# Patient Record
Sex: FEMALE | Race: WHITE | NOT HISPANIC OR LATINO | Employment: FULL TIME | URBAN - METROPOLITAN AREA
[De-identification: names, ages, dates, MRNs, and addresses within clinical notes are randomized per-mention and may not be internally consistent; named-entity substitution may affect disease eponyms.]

---

## 2017-01-09 RX ORDER — IRBESARTAN 300 MG/1
TABLET ORAL
Refills: 3 | OUTPATIENT
Start: 2017-01-09

## 2017-01-09 NOTE — TELEPHONE ENCOUNTER
Was the patient seen in the last year in this department? No     Does patient have an active prescription for medications requested? No     Received Request Via: Pharmacy      Pt met protocol?: No, LAST OV AT [facility] [place]  6/16/15

## 2017-02-08 ENCOUNTER — OFFICE VISIT (OUTPATIENT)
Dept: MEDICAL GROUP | Facility: MEDICAL CENTER | Age: 67
End: 2017-02-08
Payer: MEDICARE

## 2017-02-08 VITALS
WEIGHT: 125 LBS | SYSTOLIC BLOOD PRESSURE: 158 MMHG | TEMPERATURE: 97.7 F | HEIGHT: 61 IN | OXYGEN SATURATION: 98 % | BODY MASS INDEX: 23.6 KG/M2 | DIASTOLIC BLOOD PRESSURE: 70 MMHG | HEART RATE: 80 BPM | RESPIRATION RATE: 16 BRPM

## 2017-02-08 DIAGNOSIS — E55.9 VITAMIN D INSUFFICIENCY: ICD-10-CM

## 2017-02-08 DIAGNOSIS — I10 ESSENTIAL HYPERTENSION: ICD-10-CM

## 2017-02-08 DIAGNOSIS — R42 DIZZY SPELLS: ICD-10-CM

## 2017-02-08 PROCEDURE — G8599 NO ASA/ANTIPLAT THER USE RNG: HCPCS | Performed by: INTERNAL MEDICINE

## 2017-02-08 PROCEDURE — G8420 CALC BMI NORM PARAMETERS: HCPCS | Performed by: INTERNAL MEDICINE

## 2017-02-08 PROCEDURE — 4040F PNEUMOC VAC/ADMIN/RCVD: CPT | Mod: 8P | Performed by: INTERNAL MEDICINE

## 2017-02-08 PROCEDURE — 1036F TOBACCO NON-USER: CPT | Performed by: INTERNAL MEDICINE

## 2017-02-08 PROCEDURE — 99214 OFFICE O/P EST MOD 30 MIN: CPT | Performed by: INTERNAL MEDICINE

## 2017-02-08 PROCEDURE — 3017F COLORECTAL CA SCREEN DOC REV: CPT | Performed by: INTERNAL MEDICINE

## 2017-02-08 PROCEDURE — G8484 FLU IMMUNIZE NO ADMIN: HCPCS | Performed by: INTERNAL MEDICINE

## 2017-02-08 PROCEDURE — 3014F SCREEN MAMMO DOC REV: CPT | Performed by: INTERNAL MEDICINE

## 2017-02-08 PROCEDURE — G8432 DEP SCR NOT DOC, RNG: HCPCS | Performed by: INTERNAL MEDICINE

## 2017-02-08 PROCEDURE — 1101F PT FALLS ASSESS-DOCD LE1/YR: CPT | Performed by: INTERNAL MEDICINE

## 2017-02-08 RX ORDER — IRBESARTAN 300 MG/1
300 TABLET ORAL
Qty: 90 TAB | Refills: 3 | Status: SHIPPED | OUTPATIENT
Start: 2017-02-08 | End: 2017-12-22 | Stop reason: SDUPTHER

## 2017-02-08 ASSESSMENT — PATIENT HEALTH QUESTIONNAIRE - PHQ9: CLINICAL INTERPRETATION OF PHQ2 SCORE: 0

## 2017-02-08 NOTE — MR AVS SNAPSHOT
"        Kelly Guerra   2017 1:00 PM   Office Visit   MRN: [id number]    Department:  [address]   Dept Phone:  [phone]    Description:  Female : 1950   Provider:  Mare Thomas M.D.           Reason for Visit     Dizziness with nausea happens monthly  & last about 1-2 min    Nevus back/ rough patch of skin on back also    Knee Injury left knee      Allergies as of 2017     Allergen Noted Reactions    Tape 2012         You were diagnosed with     Essential hypertension   [5090402]       Dizzy spells   [536942]       Vitamin D insufficiency   [994731]         Vital Signs     Blood Pressure Pulse Temperature Respirations Height Weight    158/70 mmHg 80 36.5 °C (97.7 °F) 16 1.549 m (5' 1\") 56.7 kg (125 lb)    Body Mass Index Oxygen Saturation Smoking Status             23.63 kg/m2 98% Never Smoker          Basic Information     Date Of Birth Sex Race Ethnicity Preferred Language    1950 Female White Non- English      Your appointments     May 30, 2017 10:30 AM   FOLLOW UP with Jeffrey Love M.D.   [facility] (--)    [address] , [facility] [address]  [place] [id number] [phone]              Problem List              ICD-10-CM Priority Class Noted - Resolved    Osteopenia M85.80   2011 - Present    Eczema L30.9   2011 - Present    History of appendectomy Z90.49   3/24/2012 - Present    History of colonic polyps Z86.010   3/24/2012 - Present    Sigmoid diverticulosis K57.30   2012 - Present    Prediabetes R73.03   2012 - Present    Hyperlipidemia E78.5   2012 - Present    Left ear pain H92.02   2013 - Present    Thyroid nodule E04.1   2013 - Present    Vitamin D insufficiency E55.9   2015 - Present    Dizzy spells R42   2015 - Present    Near syncope R55   10/12/2015 - Present    Essential hypertension, benign I10   10/12/2015 - Present    Esophageal reflux K21.9   2015 - " Present    Menopause Z78.0   11/19/2015 - Present    PVCs (premature ventricular contractions) I49.3   8/17/2016 - Present    Right-sided carotid artery disease (CMS-HCC) I77.9   11/18/2016 - Present    Dyslipidemia E78.5   11/18/2016 - Present    Coronary artery disease, non-occlusive I25.10   11/18/2016 - Present      Health Maintenance        Date Due Completion Dates    IMM DTaP/Tdap/Td Vaccine (1 - Tdap) 7/23/1968 ---    PAP SMEAR 7/23/1970 ---    IMM ZOSTER VACCINE 7/23/2009 ---    BONE DENSITY 7/23/2014 ---    IMM PNEUMOCOCCAL 65+ (ADULT) LOW/MEDIUM RISK SERIES (1 of 2 - PCV13) 7/23/2014 ---    IMM INFLUENZA (1) 9/1/2016 ---    COLONOSCOPY 6/7/2017 6/7/2012 (Done)    Override on 6/7/2012: Done (5 year return)    MAMMOGRAM 12/21/2017 12/21/2016, 10/7/2015, 12/30/2013, 12/26/2012, 6/22/2010, 6/22/2010, 11/26/2008, 11/26/2008, 10/24/2007, 10/24/2007            Current Immunizations     No immunizations on file.      Below and/or attached are the medications your provider expects you to take. Review all of your home medications and newly ordered medications with your provider and/or pharmacist. Follow medication instructions as directed by your provider and/or pharmacist. Please keep your medication list with you and share with your provider. Update the information when medications are discontinued, doses are changed, or new medications (including over-the-counter products) are added; and carry medication information at all times in the event of emergency situations     Allergies:  TAPE - (reactions not documented)               Medications  Valid as of: February 08, 2017 -  1:50 PM    Generic Name Brand Name Tablet Size Instructions for use    ALPRAZolam (Tab) XANAX 0.5 MG 1 tab 1 hour before MRI. Repeat in 1 hour as needed        Atorvastatin Calcium (Tab) LIPITOR 10 MG Take 1 Tab by mouth every bedtime.        Irbesartan (Tab) AVAPRO 300 MG Take 1 Tab by mouth every day.        Omeprazole (CAPSULE DELAYED  RELEASE) PRILOSEC 20 MG Take 1 Cap by mouth every day.        RaNITidine HCl   Take  by mouth.        .                 Medicines prescribed today were sent to:     [facility][id number] - [place], [place] - [address] [place] [address]    [address] [place] [address]    Phone: [phone] Fax: [phone]    Open 24 Hours?: No      Medication refill instructions:       If your prescription bottle indicates you have medication refills left, it is not necessary to call your provider’s office. Please contact your pharmacy and they will refill your medication.    If your prescription bottle indicates you do not have any refills left, you may request refills at any time through one of the following ways: The online [employer] system (except Urgent Care), by calling your provider’s office, or by asking your pharmacy to contact your provider’s office with a refill request. Medication refills are processed only during regular business hours and may not be available until the next business day. Your provider may request additional information or to have a follow-up visit with you prior to refilling your medication.   *Please Note: Medication refills are assigned a new Rx number when refilled electronically. Your pharmacy may indicate that no refills were authorized even though a new prescription for the same medication is available at the pharmacy. Please request the medicine by name with the pharmacy before contacting your provider for a refill.        Referral     A referral request has been sent to our patient care coordination department. Please allow 3-5 business days for us to process this request and contact you either by phone or mail. If you do not hear from us by the 5th business day, please call us at [phone].           [employer] Access Code: Activation code not generated  Current [employer] Status: Active

## 2017-02-08 NOTE — PROGRESS NOTES
"  Chief Complaint   Patient presents with   • Dizziness     with nausea happens monthly Jan 22 &23 last about 1-2 min   • Nevus     back/ rough patch of skin on back also   • Knee Injury     left knee       HISTORY OF PRESENT ILLNESS: Patient is a 66 y.o. female patient who presents today to follow-up on several chronic problems.     1. Essential hypertension    Patient has been on Avapro 300 mg daily for some time. Her blood pressures run in the 158/70 range. She states her father always had extremely high blood pressure and feels that her control is very adequate where it is. Apparently she has a history of not tolerating other blood pressure medications in the past.    2. Dizzy spells    Patient has been struggling with episodic \"dizzy spells\" since September 2014. She has had an extensive workup including by the cardiologist(Tilt table test, carotid ultrasound ×2, and stress echo), neurologist (MRI, MRA in November 2015), ENT evaluation, and abdominal CT scan. She had a complete blood panel and a mammogram within the last 6 months all of which were also normal. She continues to have her spells about 10 days per month. They occur in clusters and happen about once per day during a cluster. She describes an episode of intense nausea, sensation of falling such that she reaches out and has to grab onto whatever is nearby. The last 4 or 5 episodes have been as accompanied by quite distinct numbness and tingling from her shoulder down to her fingertips. Her  states that more recently she has been mildly confused and \"out of it\" for 10 or 15 minutes following each episode which only lasts about a minute. She had one while driving and following the episode was not certain of where she was, and it was in a familiar neighborhood. The only test she has not had thus far is an EEG. Otherwise the patient is in good health and she really has no complaints.    3. Vitamin D insufficiency    Results for KACY MEGHAN (MRN " [id number]) as of 2/8/2017 15:09   Ref. Range 6/16/2015 09:15 11/9/2015 11:03   25-Hydroxy   Vitamin D 25 Latest Ref Range: 30.0-100.0 ng/mL 22 (L) 20.9 (L)     Patient is not currently taking any supplementation. She did have a bone density done about 4 years ago where she was told that her bone density was actually above the average for her age. Specifically she did not have osteopenia or osteoporosis.      Patient Active Problem List    Diagnosis Date Noted   • Right-sided carotid artery disease (CMS-HCC) 11/18/2016   • Dyslipidemia 11/18/2016   • Coronary artery disease, non-occlusive 11/18/2016   • PVCs (premature ventricular contractions) 08/17/2016   • Esophageal reflux 11/19/2015   • Menopause 11/19/2015   • Near syncope 10/12/2015   • Essential hypertension, benign 10/12/2015   • Dizzy spells 08/24/2015   • Vitamin D insufficiency 06/29/2015   • Left ear pain 12/16/2013   • Thyroid nodule 12/16/2013   • Sigmoid diverticulosis 04/04/2012   • Prediabetes 04/04/2012   • Hyperlipidemia 04/04/2012   • History of appendectomy 03/24/2012   • History of colonic polyps 03/24/2012   • Osteopenia 06/07/2011   • Eczema 06/07/2011      Allergies:Tape    Current meds including changes today  Current Outpatient Prescriptions   Medication Sig Dispense Refill   • irbesartan (AVAPRO) 300 MG Tab Take 1 Tab by mouth every day. 90 Tab 3   • RaNITidine HCl (ZANTAC PO) Take  by mouth.     • omeprazole (PRILOSEC) 20 MG delayed-release capsule Take 1 Cap by mouth every day. (Patient not taking: Reported on 11/18/2016) 30 Cap 3   • alprazolam (XANAX) 0.5 MG Tab 1 tab 1 hour before MRI. Repeat in 1 hour as needed (Patient not taking: Reported on 11/18/2016) 4 Tab 0   • atorvastatin (LIPITOR) 10 MG Tab Take 1 Tab by mouth every bedtime. 90 Tab 3     No current facility-administered medications for this visit.     Social History   Substance Use Topics   • Smoking status: Never Smoker    • Smokeless tobacco: Not on file   • Alcohol Use:  "0.0 oz/week     0 Standard drinks or equivalent per week      Comment: occ     Social History     Social History Narrative       Family History   Problem Relation Age of Onset   • Hypertension Mother        Review of Systems:  No chest pain, No shortness of breath, No dyspnea on exertion  Gastrointestinal ROS: No abdominal pain, No nausea, vomiting, diarrhea, or constipation        Exam:      Blood pressure 158/70, pulse 80, temperature 36.5 °C (97.7 °F), resp. rate 16, height 1.549 m (5' 1\"), weight 56.7 kg (125 lb), SpO2 98 %.  General:  Well nourished, well developed female in NAD affect and mood within normal limits  Head is grossly normal.  Pulmonary: Clear to ausculation.  Normal effort. No rales, rhonchi, or wheezing.  Cardiovascular: Regular rate and rhythm without murmur.   Extremities: no clubbing, cyanosis, or edema.  Neuro: moves all extremities symmetrically      Please note that this dictation was created using voice recognition software. I have made every reasonable attempt to correct obvious errors, but I expect that there are errors of grammar and possibly content that I did not discover before finalizing the note.    Assessment/Plan:  1. Essential hypertension      - irbesartan (AVAPRO) 300 MG Tab; Take 1 Tab by mouth every day.  Dispense: 90 Tab; Refill: 3    2. Dizzy spells    I discussed with the patient and her  that we may never know what is causing her spells to occur. Certainly a lot of things have been ruled out including at brain tumor, aneurysm, any cardiac abnormality/arrhythmia (she also wore a Holter monitor for one month), inner ear pathology. With her history of post episode altered mental status it certainly raises the possibility of seizures.   A plain EEG may not catch it however as they are so episodic in nature.    - REFERRAL TO NEURODIAGNOSTICS (EEG,EP,EMG/NCS/DBS) Modality Requested: Ambulatory EEG    3. Vitamin D insufficiency    Recommended over-the-counter supplement " either multivitamin or specifically vitamin D. At this point she does not really need to have a repeat DEXA scan as her previous one was normal.    Total face-to-face time spent with patient 45 minutes discussing her complex history and her unusual but concerning to her medical problems.    Followup: No Follow-up on file.      Patient would prefer not to make a follow-up appointment at this time. She will call when she is ready to see Dr. Charles again.

## 2017-02-16 ENCOUNTER — TELEPHONE (OUTPATIENT)
Dept: HOSPICE | Facility: HOSPICE | Age: 67
End: 2017-02-16

## 2017-02-16 DIAGNOSIS — R42 DIZZY SPELLS: ICD-10-CM

## 2017-05-30 ENCOUNTER — OFFICE VISIT (OUTPATIENT)
Dept: CARDIOLOGY | Facility: MEDICAL CENTER | Age: 67
End: 2017-05-30
Payer: MEDICARE

## 2017-05-30 VITALS
HEIGHT: 61 IN | DIASTOLIC BLOOD PRESSURE: 80 MMHG | OXYGEN SATURATION: 94 % | WEIGHT: 125 LBS | HEART RATE: 67 BPM | SYSTOLIC BLOOD PRESSURE: 180 MMHG | BODY MASS INDEX: 23.6 KG/M2

## 2017-05-30 DIAGNOSIS — I25.10 CORONARY ARTERY DISEASE, NON-OCCLUSIVE: ICD-10-CM

## 2017-05-30 DIAGNOSIS — R42 DIZZY SPELLS: ICD-10-CM

## 2017-05-30 DIAGNOSIS — I49.3 PVCS (PREMATURE VENTRICULAR CONTRACTIONS): ICD-10-CM

## 2017-05-30 DIAGNOSIS — E78.5 DYSLIPIDEMIA: ICD-10-CM

## 2017-05-30 DIAGNOSIS — I10 ESSENTIAL HYPERTENSION, BENIGN: ICD-10-CM

## 2017-05-30 PROCEDURE — G8432 DEP SCR NOT DOC, RNG: HCPCS | Performed by: INTERNAL MEDICINE

## 2017-05-30 PROCEDURE — 3014F SCREEN MAMMO DOC REV: CPT | Performed by: INTERNAL MEDICINE

## 2017-05-30 PROCEDURE — 4040F PNEUMOC VAC/ADMIN/RCVD: CPT | Mod: 8P | Performed by: INTERNAL MEDICINE

## 2017-05-30 PROCEDURE — G8599 NO ASA/ANTIPLAT THER USE RNG: HCPCS | Performed by: INTERNAL MEDICINE

## 2017-05-30 PROCEDURE — 99214 OFFICE O/P EST MOD 30 MIN: CPT | Performed by: INTERNAL MEDICINE

## 2017-05-30 PROCEDURE — G8420 CALC BMI NORM PARAMETERS: HCPCS | Performed by: INTERNAL MEDICINE

## 2017-05-30 PROCEDURE — 1036F TOBACCO NON-USER: CPT | Performed by: INTERNAL MEDICINE

## 2017-05-30 PROCEDURE — 1101F PT FALLS ASSESS-DOCD LE1/YR: CPT | Performed by: INTERNAL MEDICINE

## 2017-05-30 ASSESSMENT — ENCOUNTER SYMPTOMS
PND: 0
PALPITATIONS: 0
MYALGIAS: 0
HEARTBURN: 0
DIZZINESS: 1
BLURRED VISION: 0
INSOMNIA: 0
CHILLS: 0
ABDOMINAL PAIN: 0
LOSS OF CONSCIOUSNESS: 0
ORTHOPNEA: 0
SHORTNESS OF BREATH: 0
NAUSEA: 0
FEVER: 0

## 2017-05-30 NOTE — PROGRESS NOTES
"Subjective:   Kelly Guerra is a 66 y.o. female who presents today for follow-up evaluation of \"nonobstructive CAD, hypertension, hyperlipidemia, PVCs and carotid artery disease and a history of persistent \"dizzy spells\".    Last seen on 8/17/2016.    Since 8/17/2016 appointment had a tilt table test that was normal.  The patient opted, on her own not to start Lipitor.  She saw her PCP Dr. Anna Charles and was supposed to be seen by ENT but for some reason her appointment did not go through.  Her dizzy spell episodes are unchanged.  They sometimes occur after she starts eating.  He also and predominantly occur with changing head positions such as moving around or sitting in the car looking in both directions.  In 2007 had a ruptured right eardrum procedure by Dr. Buddy Aguilar, ENT.    Past medical history  At 8/17/2016 appointment she continued to have episodic dizzy spells.  Her episodes are unpredictable.  3 weeks ago she states she had \"20 episodes in 6 days\" she's had none in the last 2 weeks.  She feels as if she has to stop what she is doing and sit down.  She has some associated nausea.  No distinct fugue-like state but possibly has some vague confusion.  Had previously seen a neurologist who stated that she was \"okay\".  No seizure activity.  Never had syncope.  No palpitations.  States blood pressure when checked runs 148/92.  Has been on a variety of antihypertensives therapy in the past including Diovan and Avapro which she feels does the best job.  She cannot recall what her other blood pressure medicines were.    Past medical history  Beginning in September, 2014 the patient has had recurrent episodes of dizziness.  Initially in September, 2013 she gone to an elementary school program for her presentation.  When she initially got out of the car she had profound dizziness but severe nausea. She went home and did not attend the program.  Since that time she's had monthly episodes of similar " "symptoms.    In August, 2015 the patient had a lot of episodes of dizziness and nausea.  The last 4 to days she's had 9 episodes.    In the interim she's been seen by ENT with no specific diagnosis.    She describes her episodes of dizziness as occurring spontaneously, fleeting with no vertigo and as \"I'm going to fall down\". She has to hold onto something otherwise she feels that she's going to fall. She has profound fatigue afterwards.    The patient denies palpitations chest pain or shortness of breath.    She's been treated for chronic hypertension.  No treatment for hyperlipidemia.  Head remote cardiac catheterization 2000 at [facility] that showed mild coronary artery disease.  Never smoked cigarettes. Denies diabetes mellitus.    Family history  Father suffered a stroke at age 55.  Father and brother have hypertension.    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  Does housework and yardwork.  . Children.  Does not drink alcohol.    Past Medical History   Diagnosis Date   • HTN (hypertension)      Past Surgical History   Procedure Laterality Date   • Colonoscopy  6/2012     clear - 5 years     Family History   Problem Relation Age of Onset   • Hypertension Mother      History   Smoking status   • Never Smoker    Smokeless tobacco   • Not on file     Allergies   Allergen Reactions   • Tape      Outpatient Encounter Prescriptions as of 5/30/2017   Medication Sig Dispense Refill   • irbesartan (AVAPRO) 300 MG Tab Take 1 Tab by mouth every day. 90 Tab 3   • RaNITidine HCl (ZANTAC PO) Take  by mouth.     • omeprazole (PRILOSEC) 20 MG delayed-release capsule Take 1 Cap by mouth every day. (Patient not taking: Reported on 11/18/2016) 30 Cap 3   • alprazolam (XANAX) 0.5 MG Tab 1 tab 1 hour before MRI. Repeat in 1 hour as needed (Patient not taking: Reported on 11/18/2016) 4 Tab 0   • atorvastatin (LIPITOR) 10 MG Tab Take 1 Tab by mouth every bedtime. (Patient not taking: Reported on 5/30/2017) 90 " "Tab 3     No facility-administered encounter medications on file as of 5/30/2017.     Review of Systems   Constitutional: Negative for fever and chills.   HENT: Positive for congestion, ear pain and tinnitus.    Eyes: Negative for blurred vision.   Respiratory: Negative for shortness of breath.    Cardiovascular: Negative for chest pain, palpitations, orthopnea, leg swelling and PND.   Gastrointestinal: Negative for heartburn, nausea and abdominal pain.   Genitourinary: Negative for dysuria.   Musculoskeletal: Negative for myalgias and joint pain.   Skin: Negative for rash.   Neurological: Positive for dizziness. Negative for loss of consciousness.   Psychiatric/Behavioral: The patient does not have insomnia.         Objective:   /80 mmHg  Pulse 67  Ht 1.549 m (5' 1\")  Wt 56.7 kg (125 lb)  BMI 23.63 kg/m2  SpO2 94%    Physical Exam   Constitutional: She is oriented to person, place, and time. She appears well-nourished. No distress.   HENT:   Head: Normocephalic.   Eyes: Conjunctivae and EOM are normal. Pupils are equal, round, and reactive to light. No scleral icterus.   Neck: Neck supple. No JVD present. Carotid bruit is not present. No thyromegaly present.   Normal jugular venous pressure.   Cardiovascular: Normal rate, regular rhythm, S1 normal and S2 normal.  Exam reveals no gallop and no friction rub.    No murmur heard.  Pulses:       Carotid pulses are 2+ on the right side, and 2+ on the left side.       Radial pulses are 2+ on the right side, and 2+ on the left side.        Femoral pulses are 2+ on the right side, and 2+ on the left side.       Posterior tibial pulses are 2+ on the right side, and 2+ on the left side.   No femoral bruits.   Pulmonary/Chest: Effort normal and breath sounds normal. She has no wheezes. She has no rhonchi. She has no rales.   Abdominal: Soft. Bowel sounds are normal. She exhibits no abdominal bruit, no pulsatile midline mass and no mass. There is no " "hepatosplenomegaly. There is no tenderness.   Musculoskeletal: She exhibits no edema.   Lymphadenopathy:     She has no cervical adenopathy.   Neurological: She is alert and oriented to person, place, and time. She has normal strength. Gait normal.   Skin: Skin is warm and dry. No cyanosis. Nails show no clubbing.   Psychiatric: She has a normal mood and affect. Her behavior is normal.        10/12/2015 EKG: Normal sinus rhythm, rate 63.    10/08/2000 CARDIAC CATHETERIZATION ([facility])  EF 65%. Normal wall motion.  LAD proximal 30% stenosis.  LAD mid 30% stenosis.  Circumflex artery minimal intimal plaquing distal vessel.  Ramus intermedius vessel normal.  RCA. Normal    10/03/2014 CAROTID ULTRASOUND  Minimal atherosclerotic disease within the left carotid bulb.   Resulting stenosis is much less than 50%.   12/20/2016 CAROTID ULTRASOUND  CONCLUSIONS   Normal bilateral carotid exam.    Very mild plaque of the carotid bifurcation bilaterally.    5/2016 cardiac event recorder: Isolated PVCs. Isolated PACs. Associated with symptoms of dizziness.    Assessment:     1. Essential hypertension, benign     2. Dyslipidemia     3. Dizzy spells     4. Coronary artery disease, non-occlusive     5. PVCs (premature ventricular contractions)         Medical Decision Making:  Today's Assessment / Status / Plan:     \"Dizzy spells\". Chronic. Positionally related. No evidence of cardiac etiology.    Hypertension. Labile. Chronic.    CAD. Nonobstructive. Clinically without symptoms of ischemia. Normal stress echocardiogram 12/2016    Carotid artery disease. Mild. Asymptomatic.     Hyperlipidemia. On atorvastatin    PVCs. Isolated.    2007. History of ruptured eardrum.    Recommendation:/Plan/Discussion  Continue current therapy.  Lipid panel next appointment.  Continue to monitor blood pressure.  Follow-up 6 months.  "

## 2017-05-30 NOTE — MR AVS SNAPSHOT
"        Kelly Guerra   2017 10:30 AM   Office Visit   MRN: [id number]    Department:  [facility]   Dept Phone:  [phone]    Description:  Female : 1950   Provider:  Jeffrey Love M.D.           Reason for Visit     Follow-Up           Allergies as of 2017     Allergen Noted Reactions    Tape 2012         You were diagnosed with     Essential hypertension, benign   [401.1.ICD-9-CM]       Dyslipidemia   [298678]         Vital Signs     Blood Pressure Pulse Height Weight Body Mass Index Oxygen Saturation    180/80 mmHg 67 1.549 m (5' 1\") 56.7 kg (125 lb) 23.63 kg/m2 94%    Smoking Status                   Never Smoker            Basic Information     Date Of Birth Sex Race Ethnicity Preferred Language    1950 Female White Non- English      Problem List              ICD-10-CM Priority Class Noted - Resolved    Osteopenia M85.80   2011 - Present    Eczema L30.9   2011 - Present    History of appendectomy Z90.49   3/24/2012 - Present    History of colonic polyps Z86.010   3/24/2012 - Present    Sigmoid diverticulosis K57.30   2012 - Present    Prediabetes R73.03   2012 - Present    Hyperlipidemia E78.5   2012 - Present    Left ear pain H92.02   2013 - Present    Thyroid nodule E04.1   2013 - Present    Vitamin D insufficiency E55.9   2015 - Present    Dizzy spells R42   2015 - Present    Near syncope R55   10/12/2015 - Present    Essential hypertension, benign I10   10/12/2015 - Present    Esophageal reflux K21.9   2015 - Present    Menopause Z78.0   2015 - Present    PVCs (premature ventricular contractions) I49.3   2016 - Present    Right-sided carotid artery disease (CMS-HCC) I77.9   2016 - Present    Dyslipidemia E78.5   2016 - Present    Coronary artery disease, non-occlusive I25.10   2016 - Present      Health Maintenance        Date Due Completion Dates    IMM DTaP/Tdap/Td Vaccine (1 - " Tdap) 7/23/1968 ---    PAP SMEAR 7/23/1970 ---    IMM ZOSTER VACCINE 7/23/2009 ---    BONE DENSITY 7/23/2014 ---    IMM PNEUMOCOCCAL 65+ (ADULT) LOW/MEDIUM RISK SERIES (1 of 2 - PCV13) 7/23/2014 ---    COLONOSCOPY 6/7/2017 6/7/2012 (Done)    Override on 6/7/2012: Done (5 year return)    MAMMOGRAM 12/21/2017 12/21/2016, 10/7/2015, 12/30/2013, 12/26/2012, 6/22/2010, 6/22/2010, 11/26/2008, 11/26/2008, 10/24/2007, 10/24/2007            Current Immunizations     No immunizations on file.      Below and/or attached are the medications your provider expects you to take. Review all of your home medications and newly ordered medications with your provider and/or pharmacist. Follow medication instructions as directed by your provider and/or pharmacist. Please keep your medication list with you and share with your provider. Update the information when medications are discontinued, doses are changed, or new medications (including over-the-counter products) are added; and carry medication information at all times in the event of emergency situations     Allergies:  TAPE - (reactions not documented)               Medications  Valid as of: May 30, 2017 - 11:15 AM    Generic Name Brand Name Tablet Size Instructions for use    ALPRAZolam (Tab) XANAX 0.5 MG 1 tab 1 hour before MRI. Repeat in 1 hour as needed        Atorvastatin Calcium (Tab) LIPITOR 10 MG Take 1 Tab by mouth every bedtime.        Irbesartan (Tab) AVAPRO 300 MG Take 1 Tab by mouth every day.        Omeprazole (CAPSULE DELAYED RELEASE) PRILOSEC 20 MG Take 1 Cap by mouth every day.        RaNITidine HCl   Take  by mouth.        .                 Medicines prescribed today were sent to:     [facility]ACY #[id number] - [place] - [address]    [address] [place] [address]    Phone: [phone] Fax: [phone]    Open 24 Hours?: No      Medication refill instructions:       If your prescription bottle indicates you have medication refills left, it is not  necessary to call your provider’s office. Please contact your pharmacy and they will refill your medication.    If your prescription bottle indicates you do not have any refills left, you may request refills at any time through one of the following ways: The online [employer] system (except Urgent Care), by calling your provider’s office, or by asking your pharmacy to contact your provider’s office with a refill request. Medication refills are processed only during regular business hours and may not be available until the next business day. Your provider may request additional information or to have a follow-up visit with you prior to refilling your medication.   *Please Note: Medication refills are assigned a new Rx number when refilled electronically. Your pharmacy may indicate that no refills were authorized even though a new prescription for the same medication is available at the pharmacy. Please request the medicine by name with the pharmacy before contacting your provider for a refill.           [employer] Access Code: Activation code not generated  Current [employer] Status: Active

## 2017-07-03 ENCOUNTER — PATIENT OUTREACH (OUTPATIENT)
Dept: HEALTH INFORMATION MANAGEMENT | Facility: OTHER | Age: 67
End: 2017-07-03

## 2017-07-03 ENCOUNTER — TELEPHONE (OUTPATIENT)
Dept: HEALTH INFORMATION MANAGEMENT | Facility: OTHER | Age: 67
End: 2017-07-03

## 2017-07-03 DIAGNOSIS — Z78.0 MENOPAUSE: ICD-10-CM

## 2017-07-03 NOTE — PROGRESS NOTES
Attempt #:1    WebIZ Checked & Epic Updated: yes  HealthConnect Verified: no  Verify PCP: yes    Communication Preference Obtained: yes     Review Care Team: yes    Annual Wellness Visit Scheduling  1. Scheduling Status:Scheduled        Care Gap Scheduling (Attempt to Schedule EACH Overdue Care Gap!)     Health Maintenance Due   Topic Date Due   • Annual Wellness Visit  Scheduled   • IMM DTaP/Tdap/Td Vaccine (1 - Tdap) Scheduled   • PAP SMEAR  Requested records   • IMM ZOSTER VACCINE  Scheduled   • BONE DENSITY  Scheduled   • IMM PNEUMOCOCCAL 65+ (ADULT) LOW/MEDIUM RISK SERIES (1 of 2 - PCV13) Scheduled   • COLONOSCOPY  Informed- Patient has apt. On 08/24/17 w/ [facility]         Sciona Activation: already active  Sciona Martell: no  Virtual Visits: no  Opt In to Text Messages: no

## 2017-07-10 ENCOUNTER — OFFICE VISIT (OUTPATIENT)
Dept: MEDICAL GROUP | Facility: PHYSICIAN GROUP | Age: 67
End: 2017-07-10
Payer: MEDICARE

## 2017-07-10 VITALS
BODY MASS INDEX: 23.22 KG/M2 | HEIGHT: 61 IN | HEART RATE: 75 BPM | TEMPERATURE: 98.6 F | SYSTOLIC BLOOD PRESSURE: 132 MMHG | OXYGEN SATURATION: 98 % | DIASTOLIC BLOOD PRESSURE: 78 MMHG | WEIGHT: 123 LBS

## 2017-07-10 DIAGNOSIS — K21.9 GASTROESOPHAGEAL REFLUX DISEASE WITHOUT ESOPHAGITIS: ICD-10-CM

## 2017-07-10 DIAGNOSIS — E78.5 DYSLIPIDEMIA: ICD-10-CM

## 2017-07-10 DIAGNOSIS — E55.9 VITAMIN D INSUFFICIENCY: ICD-10-CM

## 2017-07-10 DIAGNOSIS — I10 ESSENTIAL HYPERTENSION, BENIGN: ICD-10-CM

## 2017-07-10 DIAGNOSIS — Z00.00 MEDICARE ANNUAL WELLNESS VISIT, INITIAL: Primary | ICD-10-CM

## 2017-07-10 DIAGNOSIS — K57.30 SIGMOID DIVERTICULOSIS: ICD-10-CM

## 2017-07-10 DIAGNOSIS — M85.80 OSTEOPENIA, UNSPECIFIED LOCATION: ICD-10-CM

## 2017-07-10 DIAGNOSIS — R42 DIZZY SPELLS: ICD-10-CM

## 2017-07-10 PROCEDURE — G0438 PPPS, INITIAL VISIT: HCPCS | Performed by: INTERNAL MEDICINE

## 2017-07-10 ASSESSMENT — PATIENT HEALTH QUESTIONNAIRE - PHQ9: CLINICAL INTERPRETATION OF PHQ2 SCORE: 0

## 2017-07-10 NOTE — PROGRESS NOTES
Chief Complaint   Patient presents with   • Annual Exam     initial         HPI:  Kelly Guerra is a 66 y.o. here for Medicare Annual Wellness Visit     Patient Active Problem List    Diagnosis Date Noted   • Dyslipidemia 11/18/2016   • Esophageal reflux 11/19/2015   • Essential hypertension, benign 10/12/2015   • Dizzy spells 08/24/2015   • Vitamin D insufficiency 06/29/2015   • Sigmoid diverticulosis 04/04/2012   • Osteopenia 06/07/2011       Current Outpatient Prescriptions   Medication Sig Dispense Refill   • irbesartan (AVAPRO) 300 MG Tab Take 1 Tab by mouth every day. 90 Tab 3   • RaNITidine HCl (ZANTAC PO) Take  by mouth.     • atorvastatin (LIPITOR) 10 MG Tab Take 1 Tab by mouth every bedtime. 90 Tab 3     No current facility-administered medications for this visit.            Current supplements as per medication list.       Allergies: Tape    Current social contact/activitie: Patient is , she still works part-time. She enjoys traveling with her .     She  reports that she has never smoked. She has never used smokeless tobacco. She reports that she does not drink alcohol or use illicit drugs.  Counseling given: Not Answered        DPA/Advanced Directive:  Patient has Advanced Directive on file.       ROS:    Gait: Uses no assistive device   Ostomy: no   Other tubes: no   Amputations: no   Chronic oxygen use: no   Last eye exam: December 2016   : Denies incontinence.       Screening:    Depression Screening    Little interest or pleasure in doing things?  0 - not at all  Feeling down, depressed , or hopeless? 0 - not at all  Trouble falling or staying asleep, or sleeping too much?     Feeling tired or having little energy?     Poor appetite or overeating?     Feeling bad about yourself - or that you are a failure or have let yourself or your family down?    Trouble concentrating on things, such as reading the newspaper or watching television?    Moving or speaking so slowly that other people  could have noticed.  Or the opposite - being so fidgety or restless that you have been moving around a lot more than usual?     Thoughts that you would be better off dead, or of hurting yourself?     Patient Health Questionnaire Score:    If depressive symptoms identified deferred to follow up visit unless specifically addressed in assessment and plan.    Interpretation of PHQ-9 Total Score   Score Severity   1-4 Minimal Depression   5-9 Mild Depression   10-14 Moderate Depression   15-19 Moderately Severe Depression   20-27 Severe Depression    Screening for Cognitive Impairment    Three Minute Recall (banana, sunrise, fence)  2/3    Draw clock face with all 12 numbers set to the hand to show 10 minures past 11 o'clock  1 5  If cognitive concerns identified deferred to follow up visit unless specifically addressed in assessment and plan.    Fall Risk Assessment    Has the patient had two or more falls in the last year or any fall with injury in the last year?  No  If Fall Risk identified deferred to follow up visit unless specifically addressed in assessment and plan.    Safety Assessment    Throw rugs on floor.  No  Handrails on all stairs.  Yes  Good lighting in all hallways.  Yes  Difficulty hearing.  No  Patient counseled about all safety risks that were identified.    Functional Assessment ADLs    Are there any barriers preventing you from cooking for yourself or meeting nutritional needs?  No.    Are there any barriers preventing you from driving safely or obtaining transportation?  No.    Are there any barriers preventing you from using a telephone or calling for help?  No.    Are there any barriers preventing you from shopping?  No.    Are there any barriers preventing you from taking care of your own finances?  No.    Are there any barriers preventing you from managing your medications?  No.    Are currently engaing any exercise or physical activity?  No.       Health Maintenance Summary                 "Annual Wellness Visit Overdue 1950     IMM DTaP/Tdap/Td Vaccine Overdue 7/23/1968     IMM ZOSTER VACCINE Overdue 7/23/2009     BONE DENSITY Overdue 7/23/2014     IMM PNEUMOCOCCAL 65+ (ADULT) LOW/MEDIUM RISK SERIES Overdue 7/23/2014     COLONOSCOPY Overdue 6/7/2017      Done 6/7/2012 5 year return    IMM INFLUENZA Next Due 9/1/2017     MAMMOGRAM Next Due 12/21/2017      Done 12/21/2016 MA-MAMMO SCREENING BILAT W/TOMOSYNTHESIS W/CAD     Patient has more history with this topic...    PAP SMEAR Next Due 12/5/2019      Done 12/5/2016 PAP IG (IMAGE GUIDED)          Patient Care Team:  Anna Charles M.D. as PCP - General (Family Medicine)  Your Physician (Emergency Medicine)  Mare Thomas M.D. (Internal Medicine)  [facility] as Consulting Physician  Jeffrey Love M.D. as Consulting Physician (Cardiology)      Social History   Substance Use Topics   • Smoking status: Never Smoker    • Smokeless tobacco: Never Used   • Alcohol Use: No     Family History   Problem Relation Age of Onset   • Hypertension Mother    • Stroke Father      She  has a past medical history of HTN (hypertension).   Past Surgical History   Procedure Laterality Date   • Colonoscopy  6/2012     clear - 5 years       Exam:     Blood pressure 132/78, pulse 75, temperature 37 °C (98.6 °F), height 1.549 m (5' 0.98\"), weight 55.792 kg (123 lb), SpO2 98 %. Body mass index is 23.25 kg/(m^2).    Hearing excellent.    Dentition good  Alert, oriented in no acute distress.  Eye contact is good, speech goal directed, affect calm      Assessment and Plan. The following treatment and monitoring plan is recommended:    1. Dizzy spells    Ongoing problem. Patient has had episodes for the past 3 years where she becomes nauseated and dizzy, these last for several minutes. She has had an extensive cardiac workup including tilt table, has seen a neurologist, and everything has come back negative. She has follow-up with Dr. Charles in " October.  - Initial Wellness Visit - Includes PPPS ()    2. Essential hypertension, benign    Stable problem, continue medication.  - Initial Wellness Visit - Includes PPPS ()    3. Osteopenia, unspecified location    Stable problem, patient has bone density scheduled for the end of August. It has been sometime since she has had one. Not currently taking calcium or vitamin D.  - Initial Wellness Visit - Includes PPPS ()    4. Dyslipidemia    Patient with mildly elevated cholesterol of 200. Not currently taking medication.  - Initial Wellness Visit - Includes PPPS ()    5. Gastroesophageal reflux disease without esophagitis    Stable problem, has had for many years. Takes Zantac several days per week, dietary modification.  - Initial Wellness Visit - Includes PPPS ()    6. Vitamin D insufficiency    Ongoing issue, has had historically low vitamin D level last checked 2 years ago. Not currently taking supplementation. Will have blood work prior to her annual with Dr. Charles in October.  - Initial Wellness Visit - Includes PPPS ()    7. Sigmoid diverticulosis    By colonoscopy, also has history of colonic polyps. Scheduled for her 5 year repeat colonoscopy at the end of this month.  - Initial Wellness Visit - Includes PPPS ()    8. Medicare annual wellness visit, initial    Up-to-date on cancer screening, is due for shingles vaccine and Pneumovax. She would like to obtain these at the pharmacy as it is more cost effective.  - Initial Wellness Visit - Includes PPPS ()        Services suggested: No services needed at this time  Health Care Screening: Age-appropriate preventive services Medicare covers discussed today and ordered if indicated.  Referrals offered: Community-based lifestyle interventions to reduce health risks and promote self-management and wellness, fall prevention, nutrition, physical activity, tobacco-use cessation, weight loss, and mental health services as per  orders if indicated.    Discussion today about general wellness and lifestyle habits:    · Prevent falls and reduce trip hazards; Cautioned about securing or removing rugs.  · Have a working fire alarm and carbon monoxide detector;   · Engage in regular physical activity and social activities         Follow-up: Return in about 1 year (around 7/10/2018) for AWV.

## 2017-07-10 NOTE — MR AVS SNAPSHOT
"Kelly Guerra   7/10/2017 1:20 PM   Office Visit   MRN: [id number]    Department:  [facility]   Dept Phone:  [phone]    Description:  Female : 1950   Provider:  Mare Thomas M.D.           Reason for Visit     Annual Exam initial      Allergies as of 7/10/2017     Allergen Noted Reactions    Tape 2012         You were diagnosed with     Medicare annual wellness visit, initial   [292010]  -  Primary     Dizzy spells   [993288]       Essential hypertension, benign   [401.1.ICD-9-CM]       Osteopenia, unspecified location   [7858477]       Dyslipidemia   [572688]       Gastroesophageal reflux disease without esophagitis   [591552]       Vitamin D insufficiency   [908470]       Sigmoid diverticulosis   [964389]         Vital Signs     Blood Pressure Pulse Temperature Height Weight Body Mass Index    132/78 mmHg 75 37 °C (98.6 °F) 1.549 m (5' 0.98\") 55.792 kg (123 lb) 23.25 kg/m2    Oxygen Saturation Smoking Status                98% Never Smoker           Basic Information     Date Of Birth Sex Race Ethnicity Preferred Language    1950 Female White Non- English      Your appointments     2017  9:30 AM   BONE DENSITY (DEXASCAN) with [facility]   [facility] ([address])    [address] [place] [id number] [phone]           No calcium supplements 24 hours prior to exam, including antacids or multivitamins w/calcium.  Pt may eat and drink normally.  No injection procedures prior to Dexa on the same day.  No barium contrast, no CTs with IV or oral contrast, no Pet/CTs and no Nuc Med injections for 7 days prior to a Dexa (including Barium Swallow, Upper GI, Small Bowel Series).  If scheduling a Dexa on the same day as a CT with IV or oral contrast, any test with barium, Pet/CT or a Nuc Med with injection, always schedule the Dexa before the other study.            Oct 02, 2017  1:00 PM   Established Patient with Anna CONWAY" MORAIMA Charles.   [facility] (--)    [address]  [address]  [place] [id number] [phone]           You will be receiving a confirmation call a few days before your appointment from our automated call confirmation system.            Dec 06, 2017 10:30 AM   FOLLOW UP with Jeffrey Love M.D.   [facility] (--)    [address], [address]  [place] [id number] [phone]              Problem List              ICD-10-CM Priority Class Noted - Resolved    Osteopenia M85.80   6/7/2011 - Present    Sigmoid diverticulosis K57.30   4/4/2012 - Present    Vitamin D insufficiency E55.9   6/29/2015 - Present    Dizzy spells R42   8/24/2015 - Present    Essential hypertension, benign I10   10/12/2015 - Present    Esophageal reflux K21.9   11/19/2015 - Present    Dyslipidemia E78.5   11/18/2016 - Present      Health Maintenance        Date Due Completion Dates    IMM DTaP/Tdap/Td Vaccine (1 - Tdap) 7/23/1968 ---    IMM ZOSTER VACCINE 7/23/2009 ---    BONE DENSITY 7/23/2014 ---    IMM PNEUMOCOCCAL 65+ (ADULT) LOW/MEDIUM RISK SERIES (1 of 2 - PCV13) 7/23/2014 ---    COLONOSCOPY 6/7/2017 6/7/2012 (Done)    Override on 6/7/2012: Done (5 year return)    IMM INFLUENZA (1) 9/1/2017 ---    MAMMOGRAM 12/21/2017 12/21/2016, 10/7/2015, 12/30/2013, 12/26/2012, 6/22/2010, 6/22/2010, 11/26/2008, 11/26/2008, 10/24/2007, 10/24/2007    PAP SMEAR 12/5/2019 12/5/2016            Current Immunizations     No immunizations on file.      Below and/or attached are the medications your provider expects you to take. Review all of your home medications and newly ordered medications with your provider and/or pharmacist. Follow medication instructions as directed by your provider and/or pharmacist. Please keep your medication list with you and share with your provider. Update the information when medications are discontinued, doses are changed, or new medications (including  over-the-counter products) are added; and carry medication information at all times in the event of emergency situations     Allergies:  TAPE - (reactions not documented)               Medications  Valid as of: July 10, 2017 -  1:58 PM    Generic Name Brand Name Tablet Size Instructions for use    Atorvastatin Calcium (Tab) LIPITOR 10 MG Take 1 Tab by mouth every bedtime.        Irbesartan (Tab) AVAPRO 300 MG Take 1 Tab by mouth every day.        RaNITidine HCl   Take  by mouth.        .                 Medicines prescribed today were sent to:     [facility]/PHARMACY #[id number] - [place], [place] - [address]    [address] [place] [address]    Phone: [phone] Fax: [phone]    Open 24 Hours?: No      Medication refill instructions:       If your prescription bottle indicates you have medication refills left, it is not necessary to call your provider’s office. Please contact your pharmacy and they will refill your medication.    If your prescription bottle indicates you do not have any refills left, you may request refills at any time through one of the following ways: The online [employer] system (except Urgent Care), by calling your provider’s office, or by asking your pharmacy to contact your provider’s office with a refill request. Medication refills are processed only during regular business hours and may not be available until the next business day. Your provider may request additional information or to have a follow-up visit with you prior to refilling your medication.   *Please Note: Medication refills are assigned a new Rx number when refilled electronically. Your pharmacy may indicate that no refills were authorized even though a new prescription for the same medication is available at the pharmacy. Please request the medicine by name with the pharmacy before contacting your provider for a refill.           [employer] Access Code: Activation code not generated  Current [employer] Status: Active

## 2017-07-25 ENCOUNTER — HOSPITAL ENCOUNTER (OUTPATIENT)
Dept: RADIOLOGY | Facility: MEDICAL CENTER | Age: 67
End: 2017-07-25
Attending: FAMILY MEDICINE
Payer: MEDICARE

## 2017-07-25 DIAGNOSIS — Z78.0 MENOPAUSE: ICD-10-CM

## 2017-07-25 PROCEDURE — 77080 DXA BONE DENSITY AXIAL: CPT

## 2017-10-16 ENCOUNTER — TELEPHONE (OUTPATIENT)
Dept: MEDICAL GROUP | Facility: LAB | Age: 67
End: 2017-10-16

## 2017-10-16 NOTE — TELEPHONE ENCOUNTER
ESTABLISHED PATIENT PRE-VISIT PLANNING     Note: Patient will not be contacted if there is no indication to call.     1.  Reviewed notes from the last few office visits within the medical group: Yes    2.  If any orders were placed at last visit or intended to be done for this visit (i.e. 6 mos follow-up), do we have Results/Consult Notes?        •  Labs - Labs ordered, NOT completed. Patient advised to complete prior to next appointment.   Note: If patient appointment is for lab review and patient did not complete labs,                check with provider if OK to reschedule patient until labs completed.       •  Imaging - Imaging ordered, completed and results are in chart.       •  Referrals - No referrals were ordered at last office visit.    3. Is this appointment scheduled as a Hospital Follow-Up? No    4.  Immunizations were updated in [employer] using WebIZ?: Yes       •  Web Iz Recommendations: FLU, PREVNAR (PCV13)  and TDAP    5.  Patient is due for the following Health Maintenance Topics:   Health Maintenance Due   Topic Date Due   • IMM DTaP/Tdap/Td Vaccine (1 - Tdap) 07/23/1968   • IMM ZOSTER VACCINE  07/23/2009   • IMM PNEUMOCOCCAL 65+ (ADULT) LOW/MEDIUM RISK SERIES (1 of 2 - PCV13) 07/23/2014   • COLONOSCOPY  06/07/2017   • IMM INFLUENZA (1) 09/01/2017           6.  Patient was NOT informed to arrive 15 min prior to their scheduled appointment and bring in their medication bottles.

## 2017-10-17 ENCOUNTER — OFFICE VISIT (OUTPATIENT)
Dept: MEDICAL GROUP | Facility: LAB | Age: 67
End: 2017-10-17
Payer: MEDICARE

## 2017-10-17 VITALS
SYSTOLIC BLOOD PRESSURE: 120 MMHG | HEART RATE: 70 BPM | RESPIRATION RATE: 12 BRPM | WEIGHT: 119 LBS | OXYGEN SATURATION: 98 % | BODY MASS INDEX: 22.47 KG/M2 | DIASTOLIC BLOOD PRESSURE: 80 MMHG | TEMPERATURE: 97.8 F | HEIGHT: 61 IN

## 2017-10-17 DIAGNOSIS — R11.0 NAUSEA: ICD-10-CM

## 2017-10-17 DIAGNOSIS — E78.5 DYSLIPIDEMIA: ICD-10-CM

## 2017-10-17 DIAGNOSIS — I10 ESSENTIAL HYPERTENSION, BENIGN: ICD-10-CM

## 2017-10-17 DIAGNOSIS — R10.813 RIGHT LOWER QUADRANT ABDOMINAL TENDERNESS WITHOUT REBOUND TENDERNESS: ICD-10-CM

## 2017-10-17 DIAGNOSIS — E55.9 VITAMIN D INSUFFICIENCY: ICD-10-CM

## 2017-10-17 DIAGNOSIS — R10.31 COLICKY RLQ ABDOMINAL PAIN: ICD-10-CM

## 2017-10-17 DIAGNOSIS — R63.4 UNINTENDED WEIGHT LOSS: ICD-10-CM

## 2017-10-17 DIAGNOSIS — R73.09 ELEVATED HEMOGLOBIN A1C: ICD-10-CM

## 2017-10-17 PROCEDURE — 99214 OFFICE O/P EST MOD 30 MIN: CPT | Performed by: FAMILY MEDICINE

## 2017-10-17 NOTE — PROGRESS NOTES
Chief Complaint   Patient presents with   • Nausea     lasting a few days/ 1 every 5-6 weeks/ X 3 years   • RLQ Pain     tenderness on and off gets worse with greassy foods   • Other     will call to schedule to do Colonoscopy       HISTORY OF PRESENT ILLNESS: Patient is a 67 y.o. female established patient who presents today with above    Nausea  This is ongoing along with dixxiness. Will have 5-10 days of feeling nauseated. Is immediate and then stops.   Did see neurology and also cardiology . Dizziness is getting better and also not as severe. MRI of the head was normal. Saw ENT and ruled out menieres she reports     RLQ pain  This is new to discuss today. This started in the last couple of months. Is very tender at times. Notices this more after she has greasy food like an in and out burger. Is not constant, is tender. Spicy foods sometimes does this too      She is due for a colonscopy and will call to make this appointment. She had to cancel last appt     Weight loss, unintentional. Hew issue. Has lost 18-20#, only 4# since July     Also is taking a baby ASA and avapro 300 mg daily     Last gyn exam is due   Mammo done 12/2016    Patient Active Problem List    Diagnosis Date Noted   • Dyslipidemia  This is chronic. She stopped taking lipitor 10 mg once daily. She is afraid to try another medication   11/18/2016   • Esophageal reflux 11/19/2015   • Essential hypertension, benign  This is chronic. She is taking Avapro 300 mg once daily  10/12/2015   • Dizzy spells 08/24/2015   • Vitamin D insufficiency  This is chronic. Not taking vitamin d  06/29/2015   • Sigmoid diverticulosis 04/04/2012   • Osteopenia 06/07/2011        Allergies:Tape    Current Outpatient Prescriptions   Medication Sig Dispense Refill   • irbesartan (AVAPRO) 300 MG Tab Take 1 Tab by mouth every day. 90 Tab 3   • RaNITidine HCl (ZANTAC PO) Take  by mouth.     • atorvastatin (LIPITOR) 10 MG Tab Take 1 Tab by mouth every bedtime. 90 Tab 3     No  "current facility-administered medications for this visit.        Social History   Substance Use Topics   • Smoking status: Never Smoker   • Smokeless tobacco: Never Used   • Alcohol use No       Social History     Social History Narrative   • No narrative on file       Family History   Problem Relation Age of Onset   • Hypertension Mother    • Stroke Father        ROS:      Exam:    /80   Pulse 70   Temp 36.6 °C (97.8 °F)   Resp 12   Ht 1.549 m (5' 1\")   Wt 54 kg (119 lb)   SpO2 98%   BMI 22.48 kg/m²     General:  Well nourished, well developed female in NAD    Physical Exam   Constitutional: Appears well-developed and well-nourished. Is active and cooperative.  Non-toxic appearance.   Head: Normocephalic and atraumatic.   Right Ear: External ear normal. Left Ear: External ear normal.   Eyes: Conjunctivae, EOM and lids are normal. No scleral icterus.   Cardiovascular: Normal rate, regular rhythm and normal heart sounds.    Pulmonary/Chest: Effort normal and breath sounds normal.   Abdominal: Soft. There is no hepatosplenomegaly. + TTP in the RLQ, no rebound, no guarding. No rigidity   Neurological: Alert. No tremor. No display of seizure activity. Coordination and gait normal.   Skin: Skin is warm and dry. Patient is not diaphoretic.   Psychiatric: patient has a normal mood and affect; speech is normal and behavior is normal.        Assessment/Plan:     1. Right lower quadrant abdominal tenderness without rebound tenderness  Unclear etiology. Check CT the abdomen and pelvis with and without contrast. I also encouraged patient to call GI as she needs to have an updated colonoscopy. Also encouraged her to call her gynecologist so she can have an updated current exam.  - CT-ABDOMEN-PELVIS WITH & W/O; Future    2. Colicky RLQ abdominal pain  As above  - CT-ABDOMEN-PELVIS WITH & W/O; Future  - COMP METABOLIC PANEL; Future  - CBC WITH DIFFERENTIAL; Future    3. Essential hypertension, benign  Controlled. No " change in medication. Check labs  - COMP METABOLIC PANEL; Future  - CBC WITH DIFFERENTIAL; Future  - TSH; Future  - MICROALBUMIN CREAT RATIO URINE; Future    4. Dyslipidemia  Not on a statin. Check labs  - LIPID PROFILE; Future  - COMP METABOLIC PANEL; Future    5. Nausea  Discussed with patient. Like her to see gastroenterology. Also CT as above.  - REFERRAL TO GASTROENTEROLOGY    6. Vitamin D insufficiency  Check vitamin D level. Continue supplementation  - VITAMIN D,25 HYDROXY; Future    7. Unintended weight loss  Check labs. Patient states that she thinks that she's been more active lately and that's why she's lost weight.  - COMP METABOLIC PANEL; Future  - CBC WITH DIFFERENTIAL; Future  - TSH; Future    8. Elevated hemoglobin A1c  Check A1c  - HEMOGLOBIN A1C; Future    Discussed with patient. I would like her to have a CT of the abdomen and pelvis and also follow-up with her gynecologist as well as her gastric neurologist.    Follow-up in the office in one month    Please note that this dictation was created using voice recognition software. I have made every reasonable attempt to correct obvious errors, but I expect that there are errors of grammar and possibly content that I did not discover before finalizing the note.

## 2017-10-18 LAB
25(OH)D3+25(OH)D2 SERPL-MCNC: 28 NG/ML (ref 30–100)
ALBUMIN SERPL-MCNC: 4.2 G/DL (ref 3.6–4.8)
ALBUMIN/CREAT UR: 4.4 MG/G CREAT (ref 0–30)
ALBUMIN/GLOB SERPL: 1.8 {RATIO} (ref 1.2–2.2)
ALP SERPL-CCNC: 76 IU/L (ref 39–117)
ALT SERPL-CCNC: 14 IU/L (ref 0–32)
AST SERPL-CCNC: 16 IU/L (ref 0–40)
BASOPHILS # BLD AUTO: 0 X10E3/UL (ref 0–0.2)
BASOPHILS NFR BLD AUTO: 1 %
BILIRUB SERPL-MCNC: 0.3 MG/DL (ref 0–1.2)
BUN SERPL-MCNC: 12 MG/DL (ref 8–27)
BUN/CREAT SERPL: 17 (ref 12–28)
CALCIUM SERPL-MCNC: 9.2 MG/DL (ref 8.7–10.3)
CHLORIDE SERPL-SCNC: 103 MMOL/L (ref 96–106)
CO2 SERPL-SCNC: 27 MMOL/L (ref 18–29)
CREAT SERPL-MCNC: 0.72 MG/DL (ref 0.57–1)
CREAT UR-MCNC: 141.3 MG/DL
EOSINOPHIL # BLD AUTO: 0.2 X10E3/UL (ref 0–0.4)
EOSINOPHIL NFR BLD AUTO: 2 %
ERYTHROCYTE [DISTWIDTH] IN BLOOD BY AUTOMATED COUNT: 14.2 % (ref 12.3–15.4)
GLOBULIN SER CALC-MCNC: 2.4 G/DL (ref 1.5–4.5)
GLUCOSE SERPL-MCNC: 104 MG/DL (ref 65–99)
HBA1C MFR BLD: 5.7 % (ref 4.8–5.6)
HCT VFR BLD AUTO: 42.1 % (ref 34–46.6)
HGB BLD-MCNC: 13.6 G/DL (ref 11.1–15.9)
IMM GRANULOCYTES # BLD: 0 X10E3/UL (ref 0–0.1)
IMM GRANULOCYTES NFR BLD: 0 %
IMMATURE CELLS  115398: NORMAL
LYMPHOCYTES # BLD AUTO: 2.5 X10E3/UL (ref 0.7–3.1)
LYMPHOCYTES NFR BLD AUTO: 34 %
MCH RBC QN AUTO: 27.4 PG (ref 26.6–33)
MCHC RBC AUTO-ENTMCNC: 32.3 G/DL (ref 31.5–35.7)
MCV RBC AUTO: 85 FL (ref 79–97)
MICROALBUMIN UR-MCNC: 6.2 UG/ML
MONOCYTES # BLD AUTO: 0.5 X10E3/UL (ref 0.1–0.9)
MONOCYTES NFR BLD AUTO: 7 %
MORPHOLOGY BLD-IMP: NORMAL
NEUTROPHILS # BLD AUTO: 4.2 X10E3/UL (ref 1.4–7)
NEUTROPHILS NFR BLD AUTO: 56 %
NRBC BLD AUTO-RTO: NORMAL %
PLATELET # BLD AUTO: 301 X10E3/UL (ref 150–379)
POTASSIUM SERPL-SCNC: 3.5 MMOL/L (ref 3.5–5.2)
PROT SERPL-MCNC: 6.6 G/DL (ref 6–8.5)
RBC # BLD AUTO: 4.97 X10E6/UL (ref 3.77–5.28)
SODIUM SERPL-SCNC: 145 MMOL/L (ref 134–144)
TSH SERPL DL<=0.005 MIU/L-ACNC: 0.89 UIU/ML (ref 0.45–4.5)
WBC # BLD AUTO: 7.4 X10E3/UL (ref 3.4–10.8)

## 2017-10-23 ENCOUNTER — HOSPITAL ENCOUNTER (OUTPATIENT)
Dept: RADIOLOGY | Facility: MEDICAL CENTER | Age: 67
End: 2017-10-23
Attending: FAMILY MEDICINE
Payer: MEDICARE

## 2017-10-23 DIAGNOSIS — R10.31 COLICKY RLQ ABDOMINAL PAIN: ICD-10-CM

## 2017-10-23 DIAGNOSIS — R10.813 RIGHT LOWER QUADRANT ABDOMINAL TENDERNESS WITHOUT REBOUND TENDERNESS: ICD-10-CM

## 2017-10-23 PROCEDURE — 700117 HCHG RX CONTRAST REV CODE 255: Performed by: FAMILY MEDICINE

## 2017-10-23 PROCEDURE — 74177 CT ABD & PELVIS W/CONTRAST: CPT

## 2017-10-23 RX ADMIN — IOHEXOL 50 ML: 240 INJECTION, SOLUTION INTRATHECAL; INTRAVASCULAR; INTRAVENOUS; ORAL at 09:06

## 2017-10-23 RX ADMIN — IOHEXOL 100 ML: 350 INJECTION, SOLUTION INTRAVENOUS at 09:49

## 2017-11-08 LAB
CHOLEST SERPL-MCNC: 198 MG/DL (ref 100–199)
COMMENT 011824: ABNORMAL
HDLC SERPL-MCNC: 59 MG/DL
LDLC SERPL CALC-MCNC: 121 MG/DL (ref 0–99)
TRIGL SERPL-MCNC: 90 MG/DL (ref 0–149)
VLDLC SERPL CALC-MCNC: 18 MG/DL (ref 5–40)

## 2017-11-17 ENCOUNTER — HOSPITAL ENCOUNTER (EMERGENCY)
Facility: MEDICAL CENTER | Age: 67
End: 2017-11-17
Attending: EMERGENCY MEDICINE
Payer: MEDICARE

## 2017-11-17 ENCOUNTER — APPOINTMENT (OUTPATIENT)
Dept: RADIOLOGY | Facility: MEDICAL CENTER | Age: 67
End: 2017-11-17
Attending: EMERGENCY MEDICINE
Payer: MEDICARE

## 2017-11-17 VITALS
DIASTOLIC BLOOD PRESSURE: 82 MMHG | OXYGEN SATURATION: 95 % | RESPIRATION RATE: 32 BRPM | TEMPERATURE: 96.9 F | SYSTOLIC BLOOD PRESSURE: 166 MMHG | BODY MASS INDEX: 23.16 KG/M2 | HEART RATE: 68 BPM | HEIGHT: 60 IN | WEIGHT: 118 LBS

## 2017-11-17 DIAGNOSIS — R56.9 SEIZURE (HCC): ICD-10-CM

## 2017-11-17 LAB
ALBUMIN SERPL BCP-MCNC: 4 G/DL (ref 3.2–4.9)
ALBUMIN/GLOB SERPL: 1.5 G/DL
ALP SERPL-CCNC: 70 U/L (ref 30–99)
ALT SERPL-CCNC: 9 U/L (ref 2–50)
ANION GAP SERPL CALC-SCNC: 12 MMOL/L (ref 0–11.9)
APTT PPP: 22.7 SEC (ref 24.7–36)
AST SERPL-CCNC: 15 U/L (ref 12–45)
BASOPHILS # BLD AUTO: 0.8 % (ref 0–1.8)
BASOPHILS # BLD: 0.06 K/UL (ref 0–0.12)
BILIRUB SERPL-MCNC: 0.6 MG/DL (ref 0.1–1.5)
BUN SERPL-MCNC: 13 MG/DL (ref 8–22)
CALCIUM SERPL-MCNC: 9.2 MG/DL (ref 8.5–10.5)
CHLORIDE SERPL-SCNC: 105 MMOL/L (ref 96–112)
CO2 SERPL-SCNC: 21 MMOL/L (ref 20–33)
CREAT SERPL-MCNC: 0.76 MG/DL (ref 0.5–1.4)
EOSINOPHIL # BLD AUTO: 0.07 K/UL (ref 0–0.51)
EOSINOPHIL NFR BLD: 0.9 % (ref 0–6.9)
ERYTHROCYTE [DISTWIDTH] IN BLOOD BY AUTOMATED COUNT: 42.5 FL (ref 35.9–50)
GFR SERPL CREATININE-BSD FRML MDRD: >60 ML/MIN/1.73 M 2
GLOBULIN SER CALC-MCNC: 2.6 G/DL (ref 1.9–3.5)
GLUCOSE SERPL-MCNC: 215 MG/DL (ref 65–99)
HCT VFR BLD AUTO: 42.7 % (ref 37–47)
HGB BLD-MCNC: 14 G/DL (ref 12–16)
IMM GRANULOCYTES # BLD AUTO: 0.03 K/UL (ref 0–0.11)
IMM GRANULOCYTES NFR BLD AUTO: 0.4 % (ref 0–0.9)
INR PPP: 0.96 (ref 0.87–1.13)
LYMPHOCYTES # BLD AUTO: 1.68 K/UL (ref 1–4.8)
LYMPHOCYTES NFR BLD: 22.7 % (ref 22–41)
MCH RBC QN AUTO: 28 PG (ref 27–33)
MCHC RBC AUTO-ENTMCNC: 32.8 G/DL (ref 33.6–35)
MCV RBC AUTO: 85.4 FL (ref 81.4–97.8)
MONOCYTES # BLD AUTO: 0.23 K/UL (ref 0–0.85)
MONOCYTES NFR BLD AUTO: 3.1 % (ref 0–13.4)
NEUTROPHILS # BLD AUTO: 5.33 K/UL (ref 2–7.15)
NEUTROPHILS NFR BLD: 72.1 % (ref 44–72)
NRBC # BLD AUTO: 0 K/UL
NRBC BLD AUTO-RTO: 0 /100 WBC
PLATELET # BLD AUTO: 258 K/UL (ref 164–446)
PMV BLD AUTO: 10.1 FL (ref 9–12.9)
POTASSIUM SERPL-SCNC: 3.8 MMOL/L (ref 3.6–5.5)
PROT SERPL-MCNC: 6.6 G/DL (ref 6–8.2)
PROTHROMBIN TIME: 12.5 SEC (ref 12–14.6)
RBC # BLD AUTO: 5 M/UL (ref 4.2–5.4)
SODIUM SERPL-SCNC: 138 MMOL/L (ref 135–145)
TROPONIN I SERPL-MCNC: <0.01 NG/ML (ref 0–0.04)
WBC # BLD AUTO: 7.4 K/UL (ref 4.8–10.8)

## 2017-11-17 PROCEDURE — 70450 CT HEAD/BRAIN W/O DYE: CPT

## 2017-11-17 PROCEDURE — 99284 EMERGENCY DEPT VISIT MOD MDM: CPT

## 2017-11-17 PROCEDURE — 80053 COMPREHEN METABOLIC PANEL: CPT

## 2017-11-17 PROCEDURE — 85730 THROMBOPLASTIN TIME PARTIAL: CPT

## 2017-11-17 PROCEDURE — 96374 THER/PROPH/DIAG INJ IV PUSH: CPT

## 2017-11-17 PROCEDURE — 85610 PROTHROMBIN TIME: CPT

## 2017-11-17 PROCEDURE — 84484 ASSAY OF TROPONIN QUANT: CPT

## 2017-11-17 PROCEDURE — 93005 ELECTROCARDIOGRAM TRACING: CPT | Performed by: EMERGENCY MEDICINE

## 2017-11-17 PROCEDURE — 700111 HCHG RX REV CODE 636 W/ 250 OVERRIDE (IP): Performed by: EMERGENCY MEDICINE

## 2017-11-17 PROCEDURE — 36415 COLL VENOUS BLD VENIPUNCTURE: CPT

## 2017-11-17 PROCEDURE — 85025 COMPLETE CBC W/AUTO DIFF WBC: CPT

## 2017-11-17 RX ORDER — ONDANSETRON 2 MG/ML
4 INJECTION INTRAMUSCULAR; INTRAVENOUS ONCE
Status: COMPLETED | OUTPATIENT
Start: 2017-11-17 | End: 2017-11-17

## 2017-11-17 RX ORDER — ASPIRIN 81 MG/1
81 TABLET, CHEWABLE ORAL DAILY
COMMUNITY
End: 2018-06-14

## 2017-11-17 RX ADMIN — ONDANSETRON 4 MG: 2 INJECTION INTRAMUSCULAR; INTRAVENOUS at 12:34

## 2017-11-17 ASSESSMENT — PAIN SCALES - GENERAL: PAINLEVEL_OUTOF10: 0

## 2017-11-17 NOTE — DISCHARGE INSTRUCTIONS
Return here at once if you have recurrent or new or worse symptoms. No driving until seizure free for 3 months and you have been cleared by your doctor

## 2017-11-17 NOTE — ED NOTES
BIB [facility] from home.  Reports aprox 2 min Seizure witnessed by  who helped her to floor. No memory of episode. No prior hx SZ.  148/88, hr 84,  100% 2 L n/c.  FSBS  178 after D 10 50 mls. [facility] states glucometer malfunctioned, 1st .

## 2017-11-18 NOTE — ED PROVIDER NOTES
ED Provider Note    CHIEF COMPLAINT  Chief Complaint   Patient presents with   • Seizure       HPI  Kelly Guerra is a 67 y.o. female who presentsTo the emergency department after apparent seizure activity. The patient has no recollection of the event and doesn't recall any specific prodrome, her  observed this evening then all of a sudden the patient's eyes got very large she seemed to have some slurring of her speech and that her entire body locked up and she had convulsive activity for 3 or 4 minutes. She then had an 8 or 9 minute postictal phase where she seemed very out of it and now she is back to baseline. She did bite the tip of her tongue during this episode. The patient has never had a seizure before and there are no recognized exacerbating or alleviating factors or precipitating events. The patient has had a three-year history of episodes of dizziness and these have been extensively evaluated including an MRI of the brain and cardiology and neurology evaluation and no etiology has been found.    REVIEW OF SYSTEMS no recent trauma to the head no fever or chills though vomiting although the patient does have some nausea at this systems negative    PAST MEDICAL HISTORY  Past Medical History:   Diagnosis Date   • HTN (hypertension)        FAMILY HISTORY  Family History   Problem Relation Age of Onset   • Hypertension Mother    • Stroke Father        SOCIAL HISTORY  Social History     Social History   • Marital status:      Spouse name: N/A   • Number of children: N/A   • Years of education: N/A     Social History Main Topics   • Smoking status: Never Smoker   • Smokeless tobacco: Never Used   • Alcohol use No   • Drug use: No   • Sexual activity: Yes     Partners: Male     Other Topics Concern   • Not on file     Social History Narrative   • No narrative on file       SURGICAL HISTORY  Past Surgical History:   Procedure Laterality Date   • COLONOSCOPY  6/2012    clear - 5 years       CURRENT  MEDICATIONS  Home Medications     Reviewed by Ivette Diego R.N. (Registered Nurse) on 11/17/17 at 1213  Med List Status: Partial   Medication Last Dose Status   aspirin (ASA) 81 MG Chew Tab chewable tablet 11/17/2017 Active   irbesartan (AVAPRO) 300 MG Tab  Active   RaNITidine HCl (ZANTAC PO) prn Active                ALLERGIES  Allergies   Allergen Reactions   • Tape        PHYSICAL EXAM  VITAL SIGNS: BP (!) 166/82   Pulse 68   Temp 36.1 °C (96.9 °F)   Resp (!) 32   Ht 1.524 m (5')   Wt 53.5 kg (118 lb)   SpO2 95%   BMI 23.05 kg/m²    Oxygen saturation is interpreted as Adequate  Constitutional: Awake verbal talkative and nontoxic appearing  HENT: No sign of trauma to the head, the patient did bite the tip of her tongue  Eyes: Pupils round and reactive extraocular motion present without difficulty  Neck: Trachea midline no JVD no meningeal findings  Cardiovascular: Regular rate and rhythm  Lungs: Clear and equal bilaterally with no apparent difficulty breathing  Abdomen/Back: Soft nontender nondistended no peritoneal findings  Skin: Warm and dry  Musculoskeletal: No leg edema or calf tenderness  Neurologic: The patient is awake verbal speaking in full complete lucid sentences with a clear voice and no difficulty, the face moves normally and she has good strength and coordination in the upper and lower extremities at this time    CHART REVIEW  The patient had MRI MRA of the brain on November 17, 2015 and these were unremarkable according to the reports this was done as part of her dizziness evaluation.    Laboratory  Today CBC shows a normal white blood count of 7.4 with hemoglobin adequate at 14, basic metabolic panel showed a slightly elevated blood glucose of 215, LFTs were otherwise unremarkable troponin is normal at less than 0.01 INR is normal 0.96    EKG interpretation  A 12-lead EKG showed sinus rhythm with PACs there is no pathologic ST elevation or depression QTc interval is 4 and 25 ms findings  are similar to cardiogram from October 12, 2015    Radiology  CT-HEAD W/O   Final Result      1.  Diffuse atrophy and white matter changes.   2.  No acute intracranial hemorrhage or territorial infarct.             MEDICAL DECISION MAKING and DISPOSITION  In the emergency department an IV was established and the patient was placed on a cardiac monitor, the patient has had no recurrent episodes and in general she looks well, I do think that the patient had a seizure but given that this is the 1st episode I do not think that she needs to be started on antiepileptic medications at this time. I have reviewed all the results with the patient and I have advised her to call her neurologist, Dr. Lacy in the morning on Monday and arrange office recheck during the week. The patient is instructed that she is not to drive or engage in any potentially dangerous activity until she has been seizure-free for 3 months and also cleared by her doctor. I have [facility] out a [facility] and the seizure report form. If there are recurrent episodes or any new or worsening symptoms or concerns the patient is to return here at once for recheck    IMPRESSION  1. Seizure         Electronically signed by: Yonas Mirza, 11/17/2017 6:26 PM

## 2017-11-20 ENCOUNTER — HOSPITAL ENCOUNTER (OUTPATIENT)
Facility: MEDICAL CENTER | Age: 67
End: 2017-11-20
Attending: FAMILY MEDICINE
Payer: MEDICARE

## 2017-11-20 ENCOUNTER — OFFICE VISIT (OUTPATIENT)
Dept: MEDICAL GROUP | Facility: LAB | Age: 67
End: 2017-11-20
Payer: MEDICARE

## 2017-11-20 VITALS
SYSTOLIC BLOOD PRESSURE: 140 MMHG | RESPIRATION RATE: 16 BRPM | DIASTOLIC BLOOD PRESSURE: 78 MMHG | HEART RATE: 76 BPM | TEMPERATURE: 98.2 F | HEIGHT: 61 IN | BODY MASS INDEX: 21.71 KG/M2 | WEIGHT: 115 LBS | OXYGEN SATURATION: 97 %

## 2017-11-20 DIAGNOSIS — E55.9 VITAMIN D INSUFFICIENCY: ICD-10-CM

## 2017-11-20 DIAGNOSIS — R73.03 PREDIABETES: ICD-10-CM

## 2017-11-20 DIAGNOSIS — I10 ESSENTIAL HYPERTENSION, BENIGN: ICD-10-CM

## 2017-11-20 DIAGNOSIS — Z87.898 HISTORY OF SEIZURE: ICD-10-CM

## 2017-11-20 DIAGNOSIS — R31.9 HEMATURIA, UNSPECIFIED TYPE: ICD-10-CM

## 2017-11-20 DIAGNOSIS — E78.5 DYSLIPIDEMIA: ICD-10-CM

## 2017-11-20 LAB
APPEARANCE UR: CLEAR
APPEARANCE UR: CLEAR
BILIRUB UR QL STRIP.AUTO: NEGATIVE
BILIRUB UR STRIP-MCNC: NORMAL MG/DL
COLOR UR AUTO: YELLOW
COLOR UR: YELLOW
EKG IMPRESSION: NORMAL
GLUCOSE UR STRIP.AUTO-MCNC: NEGATIVE MG/DL
GLUCOSE UR STRIP.AUTO-MCNC: NORMAL MG/DL
HBA1C MFR BLD: 5.4 % (ref ?–5.8)
INT CON NEG: NEGATIVE
INT CON POS: POSITIVE
KETONES UR STRIP.AUTO-MCNC: NEGATIVE MG/DL
KETONES UR STRIP.AUTO-MCNC: NORMAL MG/DL
LEUKOCYTE ESTERASE UR QL STRIP.AUTO: NEGATIVE
LEUKOCYTE ESTERASE UR QL STRIP.AUTO: NORMAL
MICRO URNS: NORMAL
NITRITE UR QL STRIP.AUTO: NEGATIVE
NITRITE UR QL STRIP.AUTO: NORMAL
PH UR STRIP.AUTO: 7.5 [PH] (ref 5–8)
PH UR STRIP.AUTO: 8 [PH]
PROT UR QL STRIP: NEGATIVE MG/DL
PROT UR QL STRIP: NORMAL MG/DL
RBC UR QL AUTO: NEGATIVE
RBC UR QL AUTO: NORMAL
SP GR UR STRIP.AUTO: 1.01
SP GR UR STRIP.AUTO: 1.01
UROBILINOGEN UR STRIP-MCNC: 0.2 MG/DL
UROBILINOGEN UR STRIP.AUTO-MCNC: 0.2 MG/DL

## 2017-11-20 PROCEDURE — 81003 URINALYSIS AUTO W/O SCOPE: CPT

## 2017-11-20 PROCEDURE — 99214 OFFICE O/P EST MOD 30 MIN: CPT | Performed by: FAMILY MEDICINE

## 2017-11-20 PROCEDURE — 83036 HEMOGLOBIN GLYCOSYLATED A1C: CPT | Performed by: FAMILY MEDICINE

## 2017-11-20 PROCEDURE — 81002 URINALYSIS NONAUTO W/O SCOPE: CPT | Performed by: FAMILY MEDICINE

## 2017-11-20 NOTE — PROGRESS NOTES
"Chief Complaint   Patient presents with   • Follow-Up       HISTORY OF PRESENT ILLNESS: Patient is a 67 y.o. female established patient who presents today to follow up. History given by both the patient and her .  Patient was seen in this office last on October 17.    In the interim patient was seen in the emergency room on November 17. She apparently had a seizure. She had a CT of the head which showed diffuse atrophy and white matter changes and no acute intracranial hemorrhage or territorial infarct. She was told to call her neurologist for follow-up.  states that he caught her before she fell to the floor in the kitchen while she was having the seizure. States she 'foamed at the mouth and she was locked up like a brick'. First thought she having a stroke. Symptoms lasted 3-4 minutes and then she was out for about 8-10 minutes. EMS arrived and  states that her blood sugar \"was almost non existant\" when checked. He reports she was given something then she improved and was taken to the ER for evaluation. She also bit her tongue really bad. Patient states she has not yet called her neurologist and she will do this. This all happened this past Friday. She has no history of seizures and she has no family history of seizures. No seizure activity since Friday. She was told not to drive     Vit d insufficiency. This is chronic. She is not taking vitamin d as the supplement makes her stomach upset     Hyperlipidemia. This is a chronic problem. Recommended to start on a statin and she has not started this. She is concerned about starting this medication because of the brain fogginess that can happen with the statins she says      Patient Active Problem List    Diagnosis Date Noted   • Right lower quadrant abdominal tenderness without rebound tenderness 10/17/2017   • Nausea 10/17/2017   • Dyslipidemia 11/18/2016   • Esophageal reflux 11/19/2015   • Essential hypertension, benign  Taking medication as " "directed  10/12/2015   • Dizzy spells 08/24/2015   • Vitamin D insufficiency 06/29/2015   • Sigmoid diverticulosis 04/04/2012   • Osteopenia 06/07/2011        Allergies:Tape    Current Outpatient Prescriptions   Medication Sig Dispense Refill   • aspirin (ASA) 81 MG Chew Tab chewable tablet Take 81 mg by mouth every day.     • irbesartan (AVAPRO) 300 MG Tab Take 1 Tab by mouth every day. 90 Tab 3   • RaNITidine HCl (ZANTAC PO) Take  by mouth.       No current facility-administered medications for this visit.        Social History   Substance Use Topics   • Smoking status: Never Smoker   • Smokeless tobacco: Never Used   • Alcohol use No       Social History     Social History Narrative   • No narrative on file       Family History   Problem Relation Age of Onset   • Hypertension Mother    • Stroke Father        ROS:        Exam:    /78   Pulse 76   Temp 36.8 °C (98.2 °F)   Resp 16   Ht 1.549 m (5' 1\")   Wt 52.2 kg (115 lb)   SpO2 97%   BMI 21.73 kg/m²     General:  Well nourished, well developed female in NAD    Physical Exam   Constitutional:  Appears well-developed and well-nourished. Is active and cooperative.  Non-toxic appearance.   Head: Normocephalic and atraumatic.   Right Ear: External ear normal. Left Ear: External ear normal.   Eyes: Conjunctivae, EOM and lids are normal. No scleral icterus.   Cardiovascular: Normal rate, regular rhythm and normal heart sounds.    Pulmonary/Chest: Effort normal and breath sounds normal.   Neurological: Alert. No tremor. No display of seizure activity. Coordination and gait normal.   Skin: Skin is warm and dry. Patient is not diaphoretic.   Psychiatric: patient has a normal mood and affect; speech is normal and behavior is normal.    Admission on 11/17/2017, Discharged on 11/17/2017   Component Date Value Ref Range Status   • Report 11/17/2017    Preliminary                    Value:[facility]    Test Date:  2017-11-17  Pt " Name:    MEGHAN WOODRUFF                Department:   MRN:        [id number]                      Room:       [facility]  Gender:     F                            Technician: [id number]  :        [id number]                   Requested By:CIERA MIRAMONTES  Order #:    [id number]                    Keri MD:    Measurements  Intervals                                Axis  Rate:       64                           P:  VT:                                      QRS:        31  QRSD:       78                           T:          70  QT:         412  QTc:        425    Interpretive Statements  ATRIAL FIBRILLATION  BASELINE WANDER IN LEAD(S) V2,V6  Compared to ECG 10/12/2015 15:54:59  Sinus rhythm no longer present     • WBC 2017 7.4  4.8 - 10.8 K/uL Final   • RBC 2017 5.00  4.20 - 5.40 M/uL Final   • Hemoglobin 2017 14.0  12.0 - 16.0 g/dL Final   • Hematocrit 2017 42.7  37.0 - 47.0 % Final   • MCV 2017 85.4  81.4 - 97.8 fL Final   • MCH 2017 28.0  27.0 - 33.0 pg Final   • MCHC 2017 32.8* 33.6 - 35.0 g/dL Final   • RDW 2017 42.5  35.9 - 50.0 fL Final   • Platelet Count 2017 258  164 - 446 K/uL Final   • MPV 2017 10.1  9.0 - 12.9 fL Final   • Neutrophils-Polys 2017 72.10* 44.00 - 72.00 % Final   • Lymphocytes 2017 22.70  22.00 - 41.00 % Final   • Monocytes 2017 3.10  0.00 - 13.40 % Final   • Eosinophils 2017 0.90  0.00 - 6.90 % Final   • Basophils 2017 0.80  0.00 - 1.80 % Final   • Immature Granulocytes 2017 0.40  0.00 - 0.90 % Final   • Nucleated RBC 2017 0.00  /100 WBC Final   • Neutrophils (Absolute) 2017 5.33  2.00 - 7.15 K/uL Final   • Lymphs (Absolute) 2017 1.68  1.00 - 4.80 K/uL Final   • Monos (Absolute) 2017 0.23  0.00 - 0.85 K/uL Final   • Eos (Absolute) 2017 0.07  0.00 - 0.51 K/uL Final   • Baso (Absolute) 2017 0.06  0.00 - 0.12 K/uL Final   • Immature Granulocytes (abs) 2017 0.03  0.00 - 0.11  K/uL Final   • NRBC (Absolute) 11/17/2017 0.00  K/uL Final   • Sodium 11/17/2017 138  135 - 145 mmol/L Final   • Potassium 11/17/2017 3.8  3.6 - 5.5 mmol/L Final   • Chloride 11/17/2017 105  96 - 112 mmol/L Final   • Co2 11/17/2017 21  20 - 33 mmol/L Final   • Anion Gap 11/17/2017 12.0* 0.0 - 11.9 Final   • Glucose 11/17/2017 215* 65 - 99 mg/dL Final   • Bun 11/17/2017 13  8 - 22 mg/dL Final   • Creatinine 11/17/2017 0.76  0.50 - 1.40 mg/dL Final   • Calcium 11/17/2017 9.2  8.5 - 10.5 mg/dL Final   • AST(SGOT) 11/17/2017 15  12 - 45 U/L Final   • ALT(SGPT) 11/17/2017 9  2 - 50 U/L Final   • Alkaline Phosphatase 11/17/2017 70  30 - 99 U/L Final   • Total Bilirubin 11/17/2017 0.6  0.1 - 1.5 mg/dL Final   • Albumin 11/17/2017 4.0  3.2 - 4.9 g/dL Final   • Total Protein 11/17/2017 6.6  6.0 - 8.2 g/dL Final   • Globulin 11/17/2017 2.6  1.9 - 3.5 g/dL Final   • A-G Ratio 11/17/2017 1.5  g/dL Final   • Troponin I 11/17/2017 <0.01  0.00 - 0.04 ng/mL Final    Comment: The Ultra Troponin I is a highly sensitive assay.  Effective 4-1-2011, the reference range for positive Troponin  has been changed.  This change follows the recommendation of  the American College of Cardiology (ACC) committee in  conjunction with the 99th percentile reference population.  ___________________________________________________  Normal ultra TNI:  0.00-0.04 ng/mL  Clinical Correlation Indicated:  0.05 - 0.78 ng/mL  Suggestive of MI:  >0.78 ng/mL     • PT 11/17/2017 12.5  12.0 - 14.6 sec Final   • INR 11/17/2017 0.96  0.87 - 1.13 Final    Comment: INR - Non-therapeutic Reference Range: 0.87-1.13  INR - Therapeutic Reference Range: 2.0-4.0     • APTT 11/17/2017 22.7* 24.7 - 36.0 sec Final   • GFR If  11/17/2017 >60  >60 mL/min/1.73 m 2 Final   • GFR If Non  11/17/2017 >60  >60 mL/min/1.73 m 2 Final   Orders Only on 10/17/2017   Component Date Value Ref Range Status   • WBC 10/18/2017 7.4  3.4 - 10.8 x10E3/uL Final   •  RBC 10/18/2017 4.97  3.77 - 5.28 x10E6/uL Final   • Hemoglobin 10/18/2017 13.6  11.1 - 15.9 g/dL Final   • Hematocrit 10/18/2017 42.1  34.0 - 46.6 % Final   • MCV 10/18/2017 85  79 - 97 fL Final   • MCH 10/18/2017 27.4  26.6 - 33.0 pg Final   • MCHC 10/18/2017 32.3  31.5 - 35.7 g/dL Final   • RDW 10/18/2017 14.2  12.3 - 15.4 % Final   • Platelet Count 10/18/2017 301  150 - 379 x10E3/uL Final   • Neutrophils-Polys 10/18/2017 56  Not Estab. % Final   • Lymphocytes 10/18/2017 34  Not Estab. % Final   • Monocytes 10/18/2017 7  Not Estab. % Final   • Eosinophils 10/18/2017 2  Not Estab. % Final   • Basophils 10/18/2017 1  Not Estab. % Final   • Immature Cells 10/18/2017 CANCELED   Final   • Neutrophils (Absolute) 10/18/2017 4.2  1.4 - 7.0 x10E3/uL Final   • Lymphs (Absolute) 10/18/2017 2.5  0.7 - 3.1 x10E3/uL Final   • Monos (Absolute) 10/18/2017 0.5  0.1 - 0.9 x10E3/uL Final   • Eos (Absolute) 10/18/2017 0.2  0.0 - 0.4 x10E3/uL Final   • Baso (Absolute) 10/18/2017 0.0  0.0 - 0.2 x10E3/uL Final   • Immature Granulocytes 10/18/2017 0  Not Estab. % Final   • Immature Granulocytes (abs) 10/18/2017 0.0  0.0 - 0.1 x10E3/uL Final   • Nucleated RBC 10/18/2017 CANCELED   Final   • Comments-Diff 10/18/2017 CANCELED   Final   • Glucose 10/18/2017 104* 65 - 99 mg/dL Final   • Bun 10/18/2017 12  8 - 27 mg/dL Final   • Creatinine 10/18/2017 0.72  0.57 - 1.00 mg/dL Final   • GFR If Non  10/18/2017 86  >59 mL/min/1.73 Final   • GFR If  10/18/2017 100  >59 mL/min/1.73 Final   • Bun-Creatinine Ratio 10/18/2017 17  12 - 28 Final   • Sodium 10/18/2017 145* 134 - 144 mmol/L Final   • Potassium 10/18/2017 3.5  3.5 - 5.2 mmol/L Final   • Chloride 10/18/2017 103  96 - 106 mmol/L Final   • Co2 10/18/2017 27  18 - 29 mmol/L Final   • Calcium 10/18/2017 9.2  8.7 - 10.3 mg/dL Final   • Total Protein 10/18/2017 6.6  6.0 - 8.5 g/dL Final   • Albumin 10/18/2017 4.2  3.6 - 4.8 g/dL Final   • Globulin 10/18/2017 2.4  1.5  - 4.5 g/dL Final   • A-G Ratio 10/18/2017 1.8  1.2 - 2.2 Final   • Total Bilirubin 10/18/2017 0.3  0.0 - 1.2 mg/dL Final   • Alkaline Phosphatase 10/18/2017 76  39 - 117 IU/L Final   • AST(SGOT) 10/18/2017 16  0 - 40 IU/L Final   • ALT(SGPT) 10/18/2017 14  0 - 32 IU/L Final   • Creatinine, Random Urine 10/18/2017 141.3  Not Estab. mg/dL Final   • Microalbumin, Urine Random 10/18/2017 6.2  Not Estab. ug/mL Final   • Microalbumin-Creatinine Ratio 10/18/2017 4.4  0.0 - 30.0 mg/g creat Final   • Glycohemoglobin 10/18/2017 5.7* 4.8 - 5.6 % Final    Comment: Pre-diabetes: 5.7 - 6.4  Diabetes: >6.4  Glycemic control for adults with diabetes: <7.0     • TSH 10/18/2017 0.888  0.450 - 4.500 uIU/mL Final   • 25-Hydroxy   Vitamin D 25 10/18/2017 28.0* 30.0 - 100.0 ng/mL Final    Comment: Vitamin D deficiency has been defined by the [place] [employer]  [employer] and an Endocrine Society practice guideline as a  level of serum 25-OH vitamin D less than 20 ng/mL (1,2).  The Endocrine Society went on to further define vitamin D  insufficiency as a level between 21 and 29 ng/mL (2).  1. IOM ([employer] [employer]). 2010. Dietary reference  intakes for calcium and D. [place]: The  National Academies Press.  2. Dorothea MF, Tera CARLSON, Jesús CINTRON, et al.  Evaluation, treatment, and prevention of vitamin D  deficiency: an Endocrine Society clinical practice  guideline. JCEM. 2011 Jul; 96(7):1911-30.     • Cholesterol,Tot 11/08/2017 198  100 - 199 mg/dL Final   • Triglycerides 11/08/2017 90  0 - 149 mg/dL Final   • HDL 11/08/2017 59  >39 mg/dL Final   • VLDL Cholesterol Calc 11/08/2017 18  5 - 40 mg/dL Final   • LDL 11/08/2017 121* 0 - 99 mg/dL Final   • Comment: 11/08/2017 CANCELED   Final     10 year ASCVD 11.84%    UA trace blood     A1c normal       Assessment/Plan:    1. History of seizure  - REFERRAL TO NEUROLOGY    2. Vitamin D insufficiency  Recommend OTC gummy to see if she can tolerate this better     3. Essential  hypertension, benign  No change in treatment     4. Dyslipidemia    5. Prediabetes  - POCT  A1C  - POCT Urinalysis    6. Hematuria, unspecified type  - URINALYSIS; Future    Discussed with patient. Referral emergently placed to neurology. She is to call neurologist to see if she can get an appointment. Patient is not to drive. Also recommend starting a statin however she does not want to add a statin to the mix up with going off. Now. I agree that she needs to start a statin. Patient is to follow-up in one month, sooner when necessary.    Please note that this dictation was created using voice recognition software. I have made every reasonable attempt to correct obvious errors, but I expect that there are errors of grammar and possibly content that I did not discover before finalizing the note.

## 2017-11-27 ENCOUNTER — OFFICE VISIT (OUTPATIENT)
Dept: NEUROLOGY | Facility: MEDICAL CENTER | Age: 67
End: 2017-11-27
Payer: MEDICARE

## 2017-11-27 VITALS
WEIGHT: 119.1 LBS | HEIGHT: 61 IN | SYSTOLIC BLOOD PRESSURE: 152 MMHG | TEMPERATURE: 97.8 F | HEART RATE: 96 BPM | DIASTOLIC BLOOD PRESSURE: 78 MMHG | BODY MASS INDEX: 22.48 KG/M2 | RESPIRATION RATE: 16 BRPM | OXYGEN SATURATION: 76 %

## 2017-11-27 DIAGNOSIS — Z87.898 HISTORY OF SEIZURE: Primary | ICD-10-CM

## 2017-11-27 PROCEDURE — 99215 OFFICE O/P EST HI 40 MIN: CPT | Performed by: PSYCHIATRY & NEUROLOGY

## 2017-11-27 ASSESSMENT — PAIN SCALES - GENERAL: PAINLEVEL: NO PAIN

## 2017-11-27 ASSESSMENT — ENCOUNTER SYMPTOMS
MEMORY LOSS: 0
SEIZURES: 1
DIZZINESS: 1
LOSS OF CONSCIOUSNESS: 0
FALLS: 0
DIARRHEA: 0

## 2017-11-28 NOTE — PROGRESS NOTES
Subjective:      Kelly Guerra is a 67 y.o. female who presents For follow-up, after a 2 year hiatus, initially seen for episodic dizziness, nausea and ataxia, who suffer from an isolated seizure on November 17, 2017.     HPI    The patient was in the usual state of health on November 17 when she had her seizure. She remembers being in the kitchen with her , had just taken 2 spoons out of the , put them in some yogurt, and that she remembers nothing until awakening on the ground with EMS provider standing over her. She remembers feeling tired, having headache, and she was confused. She had bitten the inside of her tongue as well. She intermittently remembers being brought to the ambulance and then here to the emergency room. Once here, her memory became more consistent. By this time she had already had significant nausea and in fact vomited in the ambulance on her way to the emergency room. She remembers having significant fatigue, headache, and dizziness. Her  was a witness to the event, caught her before she fell to the ground, but described a rather prolonged episode with her eyes rolling, unresponsiveness, and generalized spasms. She was not incontinent.    I reviewed the electronic health record, ER physician notes indicated the possibility of seizure though he was not overly convinced. Routine blood work and CT scan of the brain were ordered, also were unremarkable. Unfortunately, serum alcohol, drug screen, prolactin levels were not checked. Since her discharge, the last 10 days she feels that cognitively there has been some residual slowing, she had in fact this type of symptom following her dizziness episodes in the past. She had significant myalgias and fatigue for several days though these resolved.    With this event, she denied any of the symptoms of dizziness, nausea or ataxia that she had been having historically, these episodes have continued without real change, and have not  "increased in frequency or severity leading up to the seizure. She denied any atypical issues of appetite change or weight loss, she was not taking any new or over-the-counter medications, was compliant with her Avapro, does not drink and denied any recreational drug use. She had not been suffering from any flulike illnesses with nausea, vomiting and diarrhea. Unfortunately her  does not present with her today, but she denies recurrent episodes of olfactory or gustatory hallucination, déjà vu, etc.    Medical, surgical and family histories are reviewed in electronic health record, there are no new drug allergies. No one in the family has a history of seizures. Her mother does have Alzheimer's disease, in end-stage, so there has been some stress related to her care and incorporation of hospice recently. The patient has been a bit sleep deprived. She is on Avapro, Zantac and baby aspirin daily.    Review of Systems   Constitutional: Negative for malaise/fatigue.   Gastrointestinal: Negative for diarrhea.   Genitourinary: Negative for dysuria.   Musculoskeletal: Negative for falls.   Neurological: Positive for dizziness and seizures. Negative for loss of consciousness.   Psychiatric/Behavioral: Negative for memory loss.        Objective:     /78   Pulse 96   Temp 36.6 °C (97.8 °F)   Resp 16   Ht 1.549 m (5' 1\")   Wt 54 kg (119 lb 1.6 oz)   SpO2 (!) 76%   BMI 22.50 kg/m²      Physical Exam    She appears in no acute distress. Vital signs reveal mild elevation of systolic blood pressure at 152/78, otherwise they're stable. There is no malar rash or jaw claudication. The neck is supple, carotid pulses are present bilaterally without asymmetry. Cardiac evaluation is unremarkable. There is no lower extremity edema.    She is fully oriented, there is no evidence of focal cognitive or language deficit. PERRLA/EOMI, visual fields are full, facial movements are symmetric, there is no bulbar dysfunction. The " tongue and uvula are midline. Jaw jerk is absent, shoulder shrug and head rotation are intact. Sensory exam is intact across the midline to temperature and light touch. There is no tremor, asterixis, or drift. Strength is 5/5 bilaterally, reflexes are brisk but present at all points without asymmetries, both toes are downgoing. She walks with normal station, arm swing is symmetric, there is no appendicular dystaxia. Fine motor control is intact with the hands, fingers and feet. Sensory exam is intact to vibration and temperature.    MRI and MRA imaging of the brain had been done in 2015 when I had last seen the patient, these were completely normal.     Assessment/Plan:     1. History of seizure  The description provided by the patient's , as well as the patient herself combined with the ER physician's note suggests a generalized tonic-clonic event. Obviously, the issue is why. Unfortunately her  is not here to talk about events that might describe complex partial events that were occurring and for which neither paid much attention. There is no other provoking circumstance at least so documented. I doubt that these are specifically related to the previous events of dizziness followed by nausea and ataxia, but again this is always possible.    She understands the driving restrictions are critical in the [facility] for 3 months following any events such as this. We talked at length about symptoms that suggest simple partial seizures and she will try to keep track of these if they were to happen. She will talk with her  about this. If she were to have another seizure, especially if she were to have 2 in rapid succession, then an emergency room visit is required, otherwise isolated seizures, if she is safe and is recovering well, would not warrant treatments, though I do need to be contacted.    Face-to-face time spent reviewing all the above. Obviously, MRI and MRA of the brain and neck need  to be repeated, EEG study as well. I will hold on empiric antiepileptic treatment for now, since we do not know what risk of recurrence there is, but this may need to change depending on recurrence. Her prognosis is quite good, but this is still up in the air. I will follow-up with her after studies are completed, we will call her with results of critical, in the interim, she'll notify the office for any recurrences.    - REFERRAL TO NEURODIAGNOSTICS (EEG,EP,EMG/NCS/DBS) Modality Requested: EEG-Video  - MR-BRAIN-W/O; Future  - MR-MRA NECK-W/O; Standing  - MR-MRA NECK-W/O    Time: Evaluation 40 minutes for exam, review, discussion, and education  Discussion: As mentioned in the assessment, over 60% of the time spent face-to-face counseling and coordinating care

## 2017-12-01 ENCOUNTER — NON-PROVIDER VISIT (OUTPATIENT)
Dept: NEUROLOGY | Facility: MEDICAL CENTER | Age: 67
End: 2017-12-01
Payer: MEDICARE

## 2017-12-01 DIAGNOSIS — Z87.898 HISTORY OF SEIZURE: Primary | ICD-10-CM

## 2017-12-01 PROCEDURE — 95951 PR EEG MONITORING/VIDEORECORD: CPT | Mod: 52 | Performed by: PSYCHIATRY & NEUROLOGY

## 2017-12-02 NOTE — PROCEDURES
DATE OF SERVICE:  12/01/2017    HISTORY OF PRESENT ILLNESS:  Patient is a 67-year-old female with a history of   sudden events of altered sensorium with GI symptoms, full recovery between,   but recurrent.  There is a history of isolated generalized seizure without   preceding symptoms.  EEG is requested to rule out seizure activity.    CONDITION OF RECORDING:  This is a 21-channel, portable, digital video EEG   tracing of proximally 38-minute duration.  Bipolar and referential montages   are used.  Both hyperventilation and photic stimulation are done.  The patient   is awake and drowsy for the tracing.  She is on Avapro and aspirin.    TRACING DESCRIPTION:  As the tracing begins, a symmetric alpha frequency   rhythm can be seen occasionally over the occipital hemispheres bilaterally.    It suppresses with eye opening.  Throughout the tracing, both with arousal and   drowsiness, intermittent and at times sustained and rhythmic activity can be   seen over the temporal regions, often independently.  What is seen is a mix of   high amplitude and sharply contoured theta activity and at times apparent   sharp and slow wave activity.  These discharges can last several seconds at a   time and are followed by brief suppression followed by return of normal   background.  No sustained electroencephalographic seizure activity is seen at   any time, there is no change in the patient's behavior on video in   association.  The background otherwise is normal.    Hyperventilation revealed no clear buildup, though there was some bilateral   muscle and movement artifact bifrontally.  Photic stimulation revealed little   driving response, there was no activation.    IMPRESSION:  This is an abnormal EEG for a patient of this age consistent with   bilateral temporal lobe dysfunction, at times more clearly organized over the   left temporal region.  Underlying susceptibility and potential irritability   and potential irritative features  are seen, though no sustained seizure   activity occurs.  Clinical correlation is suggested, recommendations for   imaging based on these findings to rule out underlying structural pathology as   well.  It may prove prudent to consider antiepileptic medication.       ____________________________________     MD TING NEWBY / DANISHA    DD:  12/01/2017 17:32:59  DT:  12/01/2017 23:36:02    D#:  [id number]  Job#:  [id number]

## 2017-12-05 ENCOUNTER — HOSPITAL ENCOUNTER (OUTPATIENT)
Dept: RADIOLOGY | Facility: MEDICAL CENTER | Age: 67
End: 2017-12-05
Attending: PSYCHIATRY & NEUROLOGY
Payer: MEDICARE

## 2017-12-05 DIAGNOSIS — Z87.898 HISTORY OF SEIZURE: ICD-10-CM

## 2017-12-05 PROCEDURE — 70551 MRI BRAIN STEM W/O DYE: CPT

## 2017-12-06 ENCOUNTER — OFFICE VISIT (OUTPATIENT)
Dept: CARDIOLOGY | Facility: MEDICAL CENTER | Age: 67
End: 2017-12-06
Payer: MEDICARE

## 2017-12-06 VITALS
OXYGEN SATURATION: 94 % | WEIGHT: 117 LBS | BODY MASS INDEX: 22.09 KG/M2 | HEART RATE: 78 BPM | SYSTOLIC BLOOD PRESSURE: 142 MMHG | HEIGHT: 61 IN | DIASTOLIC BLOOD PRESSURE: 76 MMHG

## 2017-12-06 DIAGNOSIS — I49.3 PVCS (PREMATURE VENTRICULAR CONTRACTIONS): ICD-10-CM

## 2017-12-06 DIAGNOSIS — I10 ESSENTIAL HYPERTENSION, BENIGN: ICD-10-CM

## 2017-12-06 DIAGNOSIS — E78.5 DYSLIPIDEMIA: ICD-10-CM

## 2017-12-06 DIAGNOSIS — I25.10 CORONARY ARTERY DISEASE, NON-OCCLUSIVE: ICD-10-CM

## 2017-12-06 PROCEDURE — 99214 OFFICE O/P EST MOD 30 MIN: CPT | Performed by: INTERNAL MEDICINE

## 2017-12-06 ASSESSMENT — ENCOUNTER SYMPTOMS
PND: 0
PALPITATIONS: 0
NAUSEA: 0
MYALGIAS: 0
SHORTNESS OF BREATH: 0
DIZZINESS: 1
INSOMNIA: 0
CHILLS: 0
LOSS OF CONSCIOUSNESS: 0
BLURRED VISION: 0
ABDOMINAL PAIN: 0
HEARTBURN: 0
ORTHOPNEA: 0
FEVER: 0

## 2017-12-06 NOTE — PROGRESS NOTES
"Subjective:   Kelly Guerra is a 66 y.o. female who presents today for follow-up evaluation of \"nonobstructive CAD, hypertension, hyperlipidemia, PVCs and carotid artery disease and a history of persistent \"dizzy spells\".    Last seen on 5/30/2017.    Since 5/3/2017 appointment the patient was hospitalized on 11/17/2017 with a grand mal seizure.  Negative neurological evaluation.  Has been seen by Dr. Clovis Oliveira, neurologist and has had a EEG and had a brain MRI yesterday results pending.  The patient's continue to have \"dizzy spells\" from the description of her  whose monitor these closely. Pretty typical of petit mall seizures with brief staring and some lip movement in addition the patient oftentimes will have nausea preceding these episodes.  The patient refuses to take Lipitor and also stopped aspirin.    Since 8/17/2016 appointment had a tilt table test that was normal.  The patient opted, on her own not to start Lipitor.  She saw her PCP Dr. Anna Charles and was supposed to be seen by ENT but for some reason her appointment did not go through.  Her dizzy spell episodes are unchanged.  They sometimes occur after she starts eating.  He also and predominantly occur with changing head positions such as moving around or sitting in the car looking in both directions.  In 2007 had a ruptured right eardrum procedure by Dr. Buddy Aguilar, ENT.    Past medical history  At 8/17/2016 appointment she continued to have episodic dizzy spells.  Her episodes are unpredictable.  3 weeks ago she states she had \"20 episodes in 6 days\" she's had none in the last 2 weeks.  She feels as if she has to stop what she is doing and sit down.  She has some associated nausea.  No distinct fugue-like state but possibly has some vague confusion.  Had previously seen a neurologist who stated that she was \"okay\".  No seizure activity.  Never had syncope.  No palpitations.  States blood pressure when checked runs 148/92.  Has " "been on a variety of antihypertensives therapy in the past including Diovan and Avapro which she feels does the best job.  She cannot recall what her other blood pressure medicines were.    Past medical history  Beginning in September, 2014 the patient has had recurrent episodes of dizziness.  Initially in September, 2013 she gone to an elementary school program for her presentation.  When she initially got out of the car she had profound dizziness but severe nausea. She went home and did not attend the program.  Since that time she's had monthly episodes of similar symptoms.    In August, 2015 the patient had a lot of episodes of dizziness and nausea.  The last 4 to days she's had 9 episodes.    In the interim she's been seen by ENT with no specific diagnosis.    She describes her episodes of dizziness as occurring spontaneously, fleeting with no vertigo and as \"I'm going to fall down\". She has to hold onto something otherwise she feels that she's going to fall. She has profound fatigue afterwards.    The patient denies palpitations chest pain or shortness of breath.    She's been treated for chronic hypertension.  No treatment for hyperlipidemia.  Head remote cardiac catheterization 2000 at [facility] that showed mild coronary artery disease.  Never smoked cigarettes. Denies diabetes mellitus.    Family history  Father suffered a stroke at age 55.  Father and brother have hypertension.    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  Does housework and yardwork.  . Children.  Does not drink alcohol.    Past Medical History:   asdfasdfads Date   • HTN (hypertension)    • Seizure (CMS-HCC)      Past Surgical History:   Procedure Laterality Date   • COLONOSCOPY  6/2012    clear - 5 years     Family History   Problem Relation Age of Onset   • Hypertension Mother    • Stroke Father      History   Smoking Status   • Never Smoker   Smokeless Tobacco   • Never Used     Allergies   Allergen Reactions   • " "Tape      Paper tape lamar     Outpatient Encounter Prescriptions as of 12/6/2017   Medication Sig Dispense Refill   • aspirin (ASA) 81 MG Chew Tab chewable tablet Take 81 mg by mouth every day.     • irbesartan (AVAPRO) 300 MG Tab Take 1 Tab by mouth every day. 90 Tab 3   • RaNITidine HCl (ZANTAC PO) Take  by mouth as needed.       No facility-administered encounter medications on file as of 12/6/2017.      Review of Systems   Constitutional: Negative for chills and fever.   HENT: Positive for congestion, ear pain and tinnitus.    Eyes: Negative for blurred vision.   Respiratory: Negative for shortness of breath.    Cardiovascular: Negative for chest pain, palpitations, orthopnea, leg swelling and PND.   Gastrointestinal: Negative for abdominal pain, heartburn and nausea.   Genitourinary: Negative for dysuria.   Musculoskeletal: Negative for joint pain and myalgias.   Skin: Negative for rash.   Neurological: Positive for dizziness. Negative for loss of consciousness.   Psychiatric/Behavioral: The patient does not have insomnia.         Objective:   /76   Pulse 78   Ht 1.549 m (5' 0.98\")   Wt 53.1 kg (117 lb)   SpO2 94%   BMI 22.12 kg/m²     Physical Exam   Constitutional: She is oriented to person, place, and time. She appears well-nourished. No distress.   HENT:   Head: Normocephalic.   Eyes: Conjunctivae and EOM are normal. Pupils are equal, round, and reactive to light. No scleral icterus.   Neck: Neck supple. No JVD present. Carotid bruit is not present. No thyromegaly present.   Normal jugular venous pressure.   Cardiovascular: Normal rate, regular rhythm, S1 normal and S2 normal.  Exam reveals no gallop and no friction rub.    No murmur heard.  Pulses:       Carotid pulses are 2+ on the right side, and 2+ on the left side.       Radial pulses are 2+ on the right side, and 2+ on the left side.        Femoral pulses are 2+ on the right side, and 2+ on the left side.       Posterior tibial pulses are " "2+ on the right side, and 2+ on the left side.   No femoral bruits.   Pulmonary/Chest: Effort normal and breath sounds normal. She has no wheezes. She has no rhonchi. She has no rales.   Abdominal: Soft. Bowel sounds are normal. She exhibits no abdominal bruit, no pulsatile midline mass and no mass. There is no hepatosplenomegaly. There is no tenderness.   Musculoskeletal: She exhibits no edema.   Lymphadenopathy:     She has no cervical adenopathy.   Neurological: She is alert and oriented to person, place, and time. She has normal strength. Gait normal.   Skin: Skin is warm and dry. No cyanosis. Nails show no clubbing.   Psychiatric: She has a normal mood and affect. Her behavior is normal.     10/12/2015 EKG: Normal sinus rhythm, rate 63.    10/08/2000 CARDIAC CATHETERIZATION ([facility]  EF 65%. Normal wall motion.  LAD proximal 30% stenosis.  LAD mid 30% stenosis.  Circumflex artery minimal intimal plaquing distal vessel.  Ramus intermedius vessel normal.  RCA. Normal    10/03/2014 CAROTID ULTRASOUND  Minimal atherosclerotic disease within the left carotid bulb.   Resulting stenosis is much less than 50%.   12/20/2016 CAROTID ULTRASOUND  CONCLUSIONS   Normal bilateral carotid exam.    Very mild plaque of the carotid bifurcation bilaterally.    5/2016 cardiac event recorder: Isolated PVCs. Isolated PACs. Associated with symptoms of dizziness.    Assessment:     1. Coronary artery disease, non-occlusive     2. Essential hypertension, benign     3. Dyslipidemia     4. PVCs (premature ventricular contractions)       Medical Decision Making:  Today's Assessment / Status / Plan:     CAD. Nonobstructive. Clinically without symptoms of ischemia. Normal stress echocardiogram 12/2016.    Hypertension. Labile. Chronic.    Hyperlipidemia.      PVCs. Isolated.    Carotid artery disease. Mild. Asymptomatic.    2007. History of ruptured eardrum.    \"Dizzy spells\". Chronic. 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 Grand mal event 11/17/2017    Recommendation:/Plan/Discussion  Continue current therapy.  Lipid panel next appointment.  Continue to monitor blood pressure.  Follow-up 6 months.

## 2017-12-22 ENCOUNTER — OFFICE VISIT (OUTPATIENT)
Dept: MEDICAL GROUP | Facility: LAB | Age: 67
End: 2017-12-22
Payer: MEDICARE

## 2017-12-22 VITALS
OXYGEN SATURATION: 100 % | WEIGHT: 117 LBS | TEMPERATURE: 97.9 F | RESPIRATION RATE: 12 BRPM | HEART RATE: 82 BPM | BODY MASS INDEX: 22.09 KG/M2 | SYSTOLIC BLOOD PRESSURE: 138 MMHG | DIASTOLIC BLOOD PRESSURE: 62 MMHG | HEIGHT: 61 IN

## 2017-12-22 DIAGNOSIS — R42 DIZZY SPELLS: ICD-10-CM

## 2017-12-22 DIAGNOSIS — I10 ESSENTIAL HYPERTENSION: ICD-10-CM

## 2017-12-22 DIAGNOSIS — D48.5 NEOPLASM OF UNCERTAIN BEHAVIOR OF SKIN OF BACK: ICD-10-CM

## 2017-12-22 DIAGNOSIS — Z87.898 HISTORY OF SEIZURE: ICD-10-CM

## 2017-12-22 PROCEDURE — 99214 OFFICE O/P EST MOD 30 MIN: CPT | Performed by: FAMILY MEDICINE

## 2017-12-22 RX ORDER — IRBESARTAN 300 MG/1
300 TABLET ORAL
Qty: 90 TAB | Refills: 3 | Status: SHIPPED | OUTPATIENT
Start: 2017-12-22 | End: 2019-03-04 | Stop reason: SDUPTHER

## 2017-12-22 NOTE — PROGRESS NOTES
Chief Complaint   Patient presents with   • Seizure     follow up Neurology   • Nevus     back/ itchy       HISTORY OF PRESENT ILLNESS: Patient is a 67 y.o. female established patient who presents today to follow up. She is here with her  today.    History of seizure  This is an ongoing issue. Patient recently was seen by neurology and had an EEG. She also had an MRI of the brain and MRA of the neck. She states that she has not heard back about the results of these tests. She says that the neurologist thinks that maybe with the combination of lack of sleep and stress echo as her seizure. Again she does not have the EEG results yet. She's had no seizure activity since the initial seizure that she had.    Dizzy spells  This is a chronic problem. Patient states that over the past 6 days she's had about 8 episodes of dizzy spells however they seem to be shorter and not as severe as in the past.    Colon cancer screening/unintentional weight loss  Patient saw GI and she had a colonoscopy and had 2 colon polyps removed. She also had an upper endoscopy due to weight loss and had a polyp biopsy. She reports no pathology results as of yet. She reports that her weight loss has stopped and she thinks that she was losing weight because she was so busy moving.     She has seen a cardiologist as well.  No changes     Patient has a mole on her back that is getting bigger and is darker and is itching. This bothers her all the time. This is a new issue to discuss. She states that she's tried to call different dermatology offices and she cannot get an appointment sooner than 6 months.     Hypertension.   This is a chronic problem. States that this is getting better  . She needs a refill of her medication    Patient is caring for her 99-year-old mom    Patient Active Problem List    Diagnosis Date Noted   • Coronary artery disease, non-occlusive 12/06/2017   • PVCs (premature ventricular contractions) 12/06/2017   • History of  "seizure 11/20/2017   • Right lower quadrant abdominal tenderness without rebound tenderness 10/17/2017   • Nausea 10/17/2017   • Dyslipidemia 11/18/2016   • Esophageal reflux 11/19/2015   • Essential hypertension, benign 10/12/2015   • Dizzy spells 08/24/2015   • Vitamin D insufficiency 06/29/2015   • Sigmoid diverticulosis 04/04/2012   • Prediabetes 04/04/2012   • Osteopenia 06/07/2011        Allergies:Tape    Current Outpatient Prescriptions   Medication Sig Dispense Refill   • aspirin (ASA) 81 MG Chew Tab chewable tablet Take 81 mg by mouth every day.     • irbesartan (AVAPRO) 300 MG Tab Take 1 Tab by mouth every day. 90 Tab 3   • RaNITidine HCl (ZANTAC PO) Take  by mouth as needed.       No current facility-administered medications for this visit.        Social History   Substance Use Topics   • Smoking status: Never Smoker   • Smokeless tobacco: Never Used   • Alcohol use No       Social History     Social History Narrative   • No narrative on file       Family History   Problem Relation Age of Onset   • Hypertension Mother    • Stroke Father        ROS:      Exam:    Blood pressure 138/62, pulse 82, temperature 36.6 °C (97.9 °F), resp. rate 12, height 1.549 m (5' 1\"), weight 53.1 kg (117 lb), SpO2 100 %.    Vitals 11/18/2016 2/8/2017 5/30/2017 7/10/2017 10/17/2017   WEIGHT 129 125 125 123 119     Vitals 11/17/2017 11/20/2017 11/27/2017 12/6/2017 12/22/2017   WEIGHT 118 115 119.1 117 117     General:  Well nourished, well developed female in NAD    Physical Exam   Constitutional:. Appears well-developed and well-nourished. Is active and cooperative.  Non-toxic appearance.   Head: Normocephalic and atraumatic.   Right Ear: External ear normal. Left Ear: External ear normal.   Eyes: Conjunctivae, EOM and lids are normal. No scleral icterus.   Neurological: Alert . No tremor. No display of seizure activity. Coordination and gait normal.   Skin: Skin is warm and dry. Patient is not diaphoretic.  patient has " oval-shaped hyperpigmented rough appearing lesion on her right mid flank  Psychiatric: patient has a normal mood and affect; speech is normal and behavior is normal.      12/5/2017 6:36 PM    HISTORY/REASON FOR EXAM:  Dizziness for 2 years, seizure 3 weeks previously.      TECHNIQUE/EXAM DESCRIPTION:  MRI of the brain without contrast with attention to the temporal lobes.    T1 sagittal, T2 fast spin-echo axial, T1 coronal, FLAIR coronal, Diffusion weighted and Apparent Diffusion Coefficient (ADC map) axial images were obtained of the whole brain. Additional T2 thin section oblique coronal images were obtained of the   temporal lobes and hippocampal formations.    The study was performed on a [employer] 1.5 Dalia MRI scanner.    COMPARISON:  11/17/2015    FINDINGS:    There is diffuse prominence of the CSF containing spaces.    There are sparse scattered foci of T2 prolongation in the deep and periventricular white matter which are nonspecific but which most likely reflect areas of chronic microangiopathic ischemic change, though this can also be seen with gliosis and   demyelinating processes.  The calvariae are unremarkable.  There are no extra-axial fluid collections.  The ventricular system and basal cisterns are within normal limits.  There are no areas of abnormal signal in the brain substance.  There are no mass effects or shift of   midline structures.  There are no hemorrhagic lesions. The diffusion weighted axial images show no evidence of acute cerebral infarction.    There is no evidence of heterotopic gray matter, gross cortical dysplasia, or mesial temporal sclerosis.    The brainstem and posterior fossa structures are unremarkable.    Vascular flow voids in the vertebrobasilar and carotid arteries, [place], and dural venous sinuses are intact. LEFT vertebral artery is dominant.    The paranasal sinuses and mastoids in the field of view are unremarkable.   Impression       1. MRI OF THE BRAIN  WITHOUT CONTRAST WITHIN NORMAL LIMITS FOR AGE WITH MILD ATROPHY AND MILD WHITE MATTER CHANGES.  2 NO EVIDENCE OF MASS LESION, HETEROTOPIC GRAY MATTER, GROSS CORTICAL DYSPLASIA, OR MESIAL TEMPORAL SCLEROSIS.         Assessment/Plan:    1. Essential hypertension  Controlled. No change in medication.  - irbesartan (AVAPRO) 300 MG Tab; Take 1 Tab by mouth every day.  Dispense: 90 Tab; Refill: 3    2. Neoplasm of uncertain behavior of skin of back  Discussed with patient. Will refer for a biopsy. Likely SK  - REFERRAL TO OTHER    3. Dizzy spells  Patient is followed by neurology    4. History of seizure  Patient is followed by neurology. She reports that she has not yet received the EEG results. She would like this. I encouraged her to reach out to her neurologist so she can get those results.     Again patient is taking care of her 99-year-old mother. This is difficult on her. She states that she knows it's the right thing to do however it is difficult. We did discuss that she is grieving her mom as her mom is failing to thrive. Recommend good self-care during this time.    Patient is to follow-up in 3 months, sooner when necessary.    Please note that this dictation was created using voice recognition software. I have made every reasonable attempt to correct obvious errors, but I expect that there are errors of grammar and possibly content that I did not discover before finalizing the note.

## 2017-12-27 ENCOUNTER — TELEPHONE (OUTPATIENT)
Dept: NEUROLOGY | Facility: MEDICAL CENTER | Age: 67
End: 2017-12-27

## 2017-12-27 DIAGNOSIS — G40.209 LOCALIZATION-RELATED PARTIAL EPILEPSY WITH COMPLEX PARTIAL SEIZURES (HCC): ICD-10-CM

## 2017-12-27 NOTE — TELEPHONE ENCOUNTER
1. Caller Name: Kelly                                         Call Back Number: [phone] (home)         Patient approves a detailed voicemail message: yes    Pt called to inform Dr. Lacy that she had a second seizure Saturday the 23rd. She would like her EEG results and to know if Dr. Lacy is going to make any changes.

## 2017-12-29 PROBLEM — G40.209 LOCALIZATION-RELATED PARTIAL EPILEPSY WITH COMPLEX PARTIAL SEIZURES (HCC): Status: ACTIVE | Noted: 2017-12-29

## 2017-12-29 RX ORDER — LEVETIRACETAM 500 MG/1
TABLET, FILM COATED, EXTENDED RELEASE ORAL
Qty: 60 TAB | Refills: 4 | Status: SHIPPED | OUTPATIENT
Start: 2017-12-29 | End: 2018-11-01 | Stop reason: SDUPTHER

## 2017-12-29 NOTE — TELEPHONE ENCOUNTER
We will put her on antiepileptic medications since she has had another seizure, this would fit with the EEG findings that suggest she is susceptible. We can talk more specifics when a follow-up with her. I will forward a prescription of Keppra  mg to be taken at bedtime for one week then increase to 1000 mg at bedtime. Explained to her some of the side effects, and as long as she tolerates the medicine, she is to stay on this through her next follow-up visit, we can talk specifics at that time.

## 2018-02-15 ENCOUNTER — TELEPHONE (OUTPATIENT)
Dept: NEUROLOGY | Facility: MEDICAL CENTER | Age: 68
End: 2018-02-15

## 2018-02-16 NOTE — TELEPHONE ENCOUNTER
The fact that she did not fall asleep will not alter the final EEG recording, unfortunately an unremarkable EEG does not rule out epilepsy. The diagnosis is still established based on history of episodes combined with EEG abnormalities, if any, etc.

## 2018-03-23 ENCOUNTER — OFFICE VISIT (OUTPATIENT)
Dept: MEDICAL GROUP | Facility: LAB | Age: 68
End: 2018-03-23
Payer: MEDICARE

## 2018-03-23 VITALS
HEIGHT: 61 IN | SYSTOLIC BLOOD PRESSURE: 140 MMHG | OXYGEN SATURATION: 98 % | TEMPERATURE: 97.8 F | HEART RATE: 78 BPM | DIASTOLIC BLOOD PRESSURE: 76 MMHG | RESPIRATION RATE: 16 BRPM | BODY MASS INDEX: 22.09 KG/M2 | WEIGHT: 117 LBS

## 2018-03-23 DIAGNOSIS — L98.9 SKIN LESION OF BACK: ICD-10-CM

## 2018-03-23 DIAGNOSIS — I10 ESSENTIAL HYPERTENSION: ICD-10-CM

## 2018-03-23 DIAGNOSIS — Z87.898 HISTORY OF SEIZURE: ICD-10-CM

## 2018-03-23 PROCEDURE — 99214 OFFICE O/P EST MOD 30 MIN: CPT | Performed by: FAMILY MEDICINE

## 2018-03-23 NOTE — PROGRESS NOTES
Chief Complaint   Patient presents with   • Hypertension     3 month follow up       HISTORY OF PRESENT ILLNESS: Patient is a 67 y.o. female established patient who presents today to follow up     Hypertension   This is a chronic problem. Patient states that her blood pressure at home has been running 120-130/70s     History of seizures  This is a chronic problem. She reports that she had another seizure on .  She had this at night. She was sitting down when this happened.  States that  alot has happened since she was seen last. Her mother in law passed away and she went to [place] for service then her mom   and the last 5 days they had not hospice care and had no hospice worker at home . Her mom is buried in [place] . This took about a month for this to occur. Trying to to sell her moms house and has had offers and she has to have this house cleaned out soon. She took care of mom since september.     Patient continues to have episodes of nausea and dizzy spells. She states that between  and  she had 9 spells. Between  and March 3 she had 5 spells and has not had anything since. She is thinking that this is related to the seizures.  Supposed to see Dr Lacy 3/4 and he had to r/s she reports. Her next appt is in . She was rx'd keppra and she did not start this. No sz for 90 days. Per Dr. Oliveira's comments, EEG shows no sz activity. Still has not ruled out epilepsy     Has an itchy lesion on the middle of her back that she woud like to have removed. This is new to discuss today     Patient Active Problem List    Diagnosis Date Noted   • Localization-related partial epilepsy with complex partial seizures (CMS-HCC) 2017   • Coronary artery disease, non-occlusive 2017   • PVCs (premature ventricular contractions) 2017   • History of seizure 2017   • Right lower quadrant abdominal tenderness without rebound tenderness 10/17/2017   • Nausea  "10/17/2017   • Dyslipidemia 11/18/2016   • Esophageal reflux 11/19/2015   • Essential hypertension 10/12/2015   • Dizzy spells 08/24/2015   • Vitamin D insufficiency 06/29/2015   • Sigmoid diverticulosis 04/04/2012   • Prediabetes 04/04/2012   • Osteopenia 06/07/2011        Allergies:Tape      Current Outpatient Prescriptions:   •  irbesartan (AVAPRO) 300 MG Tab, Take 1 Tab by mouth every day., Disp: 90 Tab, Rfl: 3  •  aspirin (ASA) 81 MG Chew Tab chewable tablet, Take 81 mg by mouth every day., Disp: , Rfl:   •  levetiracetam (KEPPRA) 500 MG TABLET SR 24 HR, 1 tab qhs for 1 week, then 2 tab qhs, Disp: 60 Tab, Rfl: 4        Social History   Substance Use Topics   • Smoking status: Never Smoker   • Smokeless tobacco: Never Used   • Alcohol use No       Social History     Social History Narrative   • No narrative on file       Family History   Problem Relation Age of Onset   • Hypertension Mother    • Stroke Father        ROS:       Exam:    Blood pressure 140/76, pulse 78, temperature 36.6 °C (97.8 °F), resp. rate 16, height 1.549 m (5' 1\"), weight 53.1 kg (117 lb), SpO2 98 %.    Vitals 5/30/2017 7/10/2017 10/17/2017 11/17/2017 11/20/2017   WEIGHT 125 123 119 118 115     Vitals 11/27/2017 12/6/2017 12/22/2017 3/23/2018   WEIGHT 119.1 117 117 117     General:  Well nourished, well developed female in NAD    Physical Exam   Constitutional:  Appears well-developed and well-nourished. Is active and cooperative.  Non-toxic appearance.   Head: Normocephalic and atraumatic.   Right Ear: External ear normal. Left Ear: External ear normal.   Eyes: Conjunctivae, EOM and lids are normal. No scleral icterus.   Cardiovascular: Normal rate, regular rhythm and normal heart sounds.    Pulmonary/Chest: Effort normal and breath sounds normal.   Abdominal: Soft. There is no hepatosplenomegaly. No tenderness. No rigidity and no guarding.   Neurological:  No tremor. No display of seizure activity. Coordination and gait normal.   Skin: " Skin is warm and dry. Patient is not diaphoretic  Dry circular lesion on the middle of her back. This is hyperpigmented. SK  Psychiatric: patient has a normal mood and affect; speech is normal and behavior is normal.      Assessment/Plan:    1. History of seizure  - REFERRAL TO NEUROLOGY    2. Skin lesion of back  Referral for removal due to irritation   - REFERRAL TO OTHER    3. Essential hypertension    Discussed with patient second referral to neurologist. Also read her the comments of Dr. Oliveira gave her EEG being non-confirmatory to rule out epilepsy also it showed no seizure activity. Patient is interested in pursuing a second opinion . She was given this referral today.    Hypertension. Blood pressure has been good at home. She is to continue her medication. She is under a lot of stress with her moms death and also trying to get her moms house cleared out so they can sell it. She is to continue to monitor her home BP       Please note that this dictation was created using voice recognition software. I have made every reasonable attempt to correct obvious errors, but I expect that there are errors of grammar and possibly content that I did not discover before finalizing the note.

## 2018-04-26 ENCOUNTER — HOSPITAL ENCOUNTER (OUTPATIENT)
Dept: RADIOLOGY | Facility: MEDICAL CENTER | Age: 68
End: 2018-04-26
Attending: FAMILY MEDICINE
Payer: MEDICARE

## 2018-04-26 ENCOUNTER — HOSPITAL ENCOUNTER (OUTPATIENT)
Dept: RADIOLOGY | Facility: MEDICAL CENTER | Age: 68
End: 2018-04-26
Attending: NURSE PRACTITIONER
Payer: MEDICARE

## 2018-04-26 ENCOUNTER — OFFICE VISIT (OUTPATIENT)
Dept: MEDICAL GROUP | Facility: PHYSICIAN GROUP | Age: 68
End: 2018-04-26
Payer: MEDICARE

## 2018-04-26 VITALS
TEMPERATURE: 98.6 F | SYSTOLIC BLOOD PRESSURE: 140 MMHG | HEART RATE: 64 BPM | RESPIRATION RATE: 14 BRPM | DIASTOLIC BLOOD PRESSURE: 62 MMHG | HEIGHT: 61 IN | WEIGHT: 117 LBS | OXYGEN SATURATION: 98 % | BODY MASS INDEX: 22.09 KG/M2

## 2018-04-26 DIAGNOSIS — Z12.31 VISIT FOR SCREENING MAMMOGRAM: ICD-10-CM

## 2018-04-26 DIAGNOSIS — M25.562 ACUTE PAIN OF LEFT KNEE: Primary | ICD-10-CM

## 2018-04-26 DIAGNOSIS — M25.562 ACUTE PAIN OF LEFT KNEE: ICD-10-CM

## 2018-04-26 PROCEDURE — 73565 X-RAY EXAM OF KNEES: CPT

## 2018-04-26 PROCEDURE — 77063 BREAST TOMOSYNTHESIS BI: CPT

## 2018-04-26 PROCEDURE — 99213 OFFICE O/P EST LOW 20 MIN: CPT | Performed by: NURSE PRACTITIONER

## 2018-04-26 ASSESSMENT — PAIN SCALES - GENERAL: PAINLEVEL: 6=MODERATE PAIN

## 2018-04-26 NOTE — PROGRESS NOTES
Hello  This shows mild arthritis, I don't think this is the primary cause of your pain so please go to physical therapy as we discussed and use the ice. Let me know if you don't improve  Thank you   Daisy Avilez RN, APN

## 2018-04-26 NOTE — PROGRESS NOTES
Chief Complaint   Patient presents with   • Knee Pain     knee pain, swelling, bended knee the wrong way  x2 wks       HISTORY OF PRESENT ILLNESS: Patient is a 67 y.o. female established patient of Dr. Anna Charles who presents today with her  to complain of left knee pain.        Acute pain of left knee  Left knee pain since 3/8/18. 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 She estimates she spends 10-12 hours with her knee flexed hyperflexed at times. The knee was painful when she stood up and is continued to be uncomfortable. Limiting her exercise. Her primary is walking been able to walk only very limited distances recently.  The knee swells at times especially at night. She hasn't taken over-the-counter medications. It hurts when she sits for a long time. She has a trip to [place] planned next month she is hoping to be complete this trip which would involve a great deal of walking..       Patient Active Problem List    Diagnosis Date Noted   • Acute pain of left knee 04/26/2018   • Localization-related partial epilepsy with complex partial seizures (CMS-HCC) 12/29/2017   • Coronary artery disease, non-occlusive 12/06/2017   • PVCs (premature ventricular contractions) 12/06/2017   • History of seizure 11/20/2017   • Right lower quadrant abdominal tenderness without rebound tenderness 10/17/2017   • Nausea 10/17/2017   • Dyslipidemia 11/18/2016   • Esophageal reflux 11/19/2015   • Essential hypertension 10/12/2015   • Dizzy spells 08/24/2015   • Vitamin D insufficiency 06/29/2015   • Sigmoid diverticulosis 04/04/2012   • Prediabetes 04/04/2012   • Osteopenia 06/07/2011       Allergies:Tape    Current Outpatient Prescriptions   Medication Sig Dispense Refill   • Losartan Potassium (COZAAR PO) Take  by mouth.     • aspirin (ASA) 81 MG Chew Tab chewable tablet Take 81 mg by mouth every day.     • Esomeprazole Magnesium (NEXIUM PO) Take  by mouth.     •  "levetiracetam (KEPPRA) 500 MG TABLET SR 24 HR 1 tab qhs for 1 week, then 2 tab qhs 60 Tab 4   • irbesartan (AVAPRO) 300 MG Tab Take 1 Tab by mouth every day. 90 Tab 3     No current facility-administered medications for this visit.        Social History   Substance Use Topics   • Smoking status: Never Smoker   • Smokeless tobacco: Never Used   • Alcohol use No       Family Status   Relation Status   • Mother Alive    99   • Father  at age 51    stroke     Family History   Problem Relation Age of Onset   • Hypertension Mother    • Stroke Father        ROS:  Review of Systems   Constitutional: Negative for fever, chills, weight loss and malaise/fatigue.   Musculoskeletal: Left knee pain and swelling  Exam:  Blood pressure 140/62, pulse 64, temperature 37 °C (98.6 °F), resp. rate 14, height 1.549 m (5' 1\"), weight 53.1 kg (117 lb), SpO2 98 %.  General:  Well nourished, well developed female in NAD  Head is grossly normal.  Left knee: No deformity noted, Tenderness to medial joint space with mild swelling, negative drawer, mild pain with internal rotation and flexion.  Xray shows:   There is mild joint space narrowing and periarticular sclerosis. No acute fracture is identified. No malalignment is noted. No cortical defect is appreciated.      Please note that this dictation was created using voice recognition software. I have made every reasonable attempt to correct obvious errors, but I expect that there are errors of grammar and possibly content that I did not discover before finalizing the note.    Assessment/Plan:  1. Acute pain of left knee  REFERRAL TO PHYSICAL THERAPY Reason for Therapy: Eval/Treat/Report    DX-KNEES-AP BILATERAL STANDING,  Use ice tid for twenty minutes. Rest , Otc analgesics if needed. Follow up if symptoms   are persistent.           "

## 2018-04-26 NOTE — ASSESSMENT & PLAN NOTE
Left knee pain since 3/8/18. She was kneeling painting base boards all day. Her mother passed away . She had a stressful year. The knee swells at times. She hasn't taken anything. It hurts when she sits for a long time. She has a trip to [place] planned next month.

## 2018-04-27 ENCOUNTER — TELEPHONE (OUTPATIENT)
Dept: MEDICAL GROUP | Facility: PHYSICIAN GROUP | Age: 68
End: 2018-04-27

## 2018-04-27 NOTE — TELEPHONE ENCOUNTER
----- Message from KENIA Holbrook sent at 4/26/2018 12:39 PM PDT -----  Hello  This shows mild arthritis, I don't think this is the primary cause of your pain so please go to physical therapy as we discussed and use the ice. Let me know if you don't improve  Thank you   Daisy Avilez RN, APN

## 2018-05-07 ENCOUNTER — TELEPHONE (OUTPATIENT)
Dept: MEDICAL GROUP | Facility: LAB | Age: 68
End: 2018-05-07

## 2018-05-07 NOTE — TELEPHONE ENCOUNTER
1. Caller Name: [facility] scheduling                                           Call Back Number: [phone] (home)        Left voice mail stating that patient recently had a seizure. She does not want to go to the ER because last time they told her not to come in to the ER but to go to her Neurologist. She called them and they can not get her in until June 14th. Please advise

## 2018-05-11 NOTE — TELEPHONE ENCOUNTER
KARI  Spoke with patient today and states that she would be leaving tomorrow for a 2 weeks cruise. She states that she had never picked up levetiracetam (KEPPRA) 500 MG TABLET SR 24 HR and was never given the message back in December to start taking this.  I informed patient to contact Neurology to let them know she had not started this medication and to see if she should start this now or wait until discussing at appt in June. She states that she will contact them,

## 2018-05-30 ENCOUNTER — OFFICE VISIT (OUTPATIENT)
Dept: URGENT CARE | Facility: PHYSICIAN GROUP | Age: 68
End: 2018-05-30
Payer: MEDICARE

## 2018-05-30 ENCOUNTER — OFFICE VISIT (OUTPATIENT)
Dept: DERMATOLOGY | Facility: IMAGING CENTER | Age: 68
End: 2018-05-30
Payer: MEDICARE

## 2018-05-30 VITALS
WEIGHT: 120 LBS | HEIGHT: 61 IN | DIASTOLIC BLOOD PRESSURE: 70 MMHG | TEMPERATURE: 97 F | BODY MASS INDEX: 22.66 KG/M2 | SYSTOLIC BLOOD PRESSURE: 120 MMHG | HEART RATE: 60 BPM

## 2018-05-30 VITALS
HEART RATE: 65 BPM | WEIGHT: 120 LBS | SYSTOLIC BLOOD PRESSURE: 136 MMHG | DIASTOLIC BLOOD PRESSURE: 80 MMHG | TEMPERATURE: 98 F | OXYGEN SATURATION: 96 % | HEIGHT: 61 IN | RESPIRATION RATE: 16 BRPM | BODY MASS INDEX: 22.66 KG/M2

## 2018-05-30 DIAGNOSIS — H69.93 ACUTE DYSFUNCTION OF BOTH EUSTACHIAN TUBES: ICD-10-CM

## 2018-05-30 DIAGNOSIS — L82.1 SEBORRHEIC KERATOSES: ICD-10-CM

## 2018-05-30 DIAGNOSIS — R23.8 PAPULE: ICD-10-CM

## 2018-05-30 DIAGNOSIS — D18.01 CHERRY ANGIOMA: ICD-10-CM

## 2018-05-30 PROCEDURE — 99203 OFFICE O/P NEW LOW 30 MIN: CPT | Performed by: EMERGENCY MEDICINE

## 2018-05-30 PROCEDURE — 99212 OFFICE O/P EST SF 10 MIN: CPT | Mod: 25 | Performed by: NURSE PRACTITIONER

## 2018-05-30 PROCEDURE — 17110 DESTRUCTION B9 LES UP TO 14: CPT | Performed by: NURSE PRACTITIONER

## 2018-05-30 NOTE — PROGRESS NOTES
Subjective:      Kelly Guerra is a 67 y.o. female who presents with Nasal Congestion (congestion, sore throat, ear pain x2 days)            HPI  Pt with right ear ache after return from [place]  Pt c/o fullness , but no discharge from her ears, some symptoms of pressure when flying . Pt also has other URI symptoms stuffiness and sore  throat.. Pt has HBP and has been using cold meds .    Allergies   Allergen Reactions   • Tape      Paper tape okay     Social History     Social History   • Marital status:      Spouse name: N/A   • Number of children: N/A   • Years of education: N/A     Occupational History   • Not on file.     Social History Main Topics   • Smoking status: Never Smoker   • Smokeless tobacco: Never Used   • Alcohol use No   • Drug use: No   • Sexual activity: Yes     Partners: Male     Other Topics Concern   • Not on file     Social History Narrative   • No narrative on file     Past Medical History:   Diagnosis Date   • Acute pain of left knee 4/26/2018   • HTN (hypertension)    • Seborrheic keratoses 5/30/2018   • Seizure (HCC)      Current Outpatient Prescriptions on File Prior to Visit   Medication Sig Dispense Refill   • Losartan Potassium (COZAAR PO) Take  by mouth.     • Esomeprazole Magnesium (NEXIUM PO) Take  by mouth.     • levetiracetam (KEPPRA) 500 MG TABLET SR 24 HR 1 tab qhs for 1 week, then 2 tab qhs 60 Tab 4   • irbesartan (AVAPRO) 300 MG Tab Take 1 Tab by mouth every day. 90 Tab 3   • aspirin (ASA) 81 MG Chew Tab chewable tablet Take 81 mg by mouth every day.       No current facility-administered medications on file prior to visit.      Family History   Problem Relation Age of Onset   • Hypertension Mother    • Stroke Father      Review of Systems   Constitutional: Negative for chills and fever.   HENT: Positive for congestion and ear pain. Negative for ear discharge.    Eyes: Negative for discharge and redness.   Respiratory: Negative for cough, sputum production and  "shortness of breath.    Cardiovascular: Negative for chest pain.   Gastrointestinal: Negative for abdominal pain, diarrhea, nausea and vomiting.   Genitourinary: Negative.    Musculoskeletal: Negative for back pain and neck pain.   Skin: Negative for rash.   Neurological: Negative for sensory change and speech change.   Psychiatric/Behavioral: The patient is not nervous/anxious.           Objective:     /80   Pulse 65   Temp 36.7 °C (98 °F)   Resp 16   Ht 1.549 m (5' 1\")   Wt 54.4 kg (120 lb)   SpO2 96%   BMI 22.67 kg/m²      Physical Exam   Constitutional: She appears well-developed and well-nourished. No distress.   HENT:   Head: Normocephalic and atraumatic.   Right Ear: External ear normal.   Left Ear: External ear normal.   TMs slightly yellow, but no erythema.  Canals clear.   Eyes: Right eye exhibits no discharge. Left eye exhibits no discharge.   Neck: Normal range of motion.   Cardiovascular: Normal rate.    Pulmonary/Chest: Effort normal and breath sounds normal. No respiratory distress.   Musculoskeletal: Normal range of motion.   Neurological: She is alert. Coordination normal.   Skin: Skin is warm and dry. She is not diaphoretic.   Nursing note and vitals reviewed.              Assessment/Plan:     DX URI           Eustachian dysfunction            HBP    I am recommending the patient initiate/ continue hydration efforts including the use of a vaporizer/humidifier/ netti pot. I also recommend the pt, initiate Mucinex or Dayquil if not hypertensive. In addition the patient will initiate the prescribed prescription medication/s: Mucinex DM . If the patient's condition exacerbates with worsening dysphagia, shortness of breath, uncontrolled fever, headache or chest pressure he/she will return immediately to the urgent care or go to  the emergency department for further evaluation.    Chris Gray        "

## 2018-05-30 NOTE — LETTER
May 30, 2018        Kelly Guerra  [address] [place] [address]        Dear Kelly:    Please ask for the next three days off from work.    If you have any questions or concerns, please don't hesitate to call.        Sincerely,        Chris Gray M.D.    Electronically Signed

## 2018-05-30 NOTE — PROGRESS NOTES
"Chief Complaint   Patient presents with   • Skin Lesion         HISTORY OF THE PRESENT ILLNESS: Patient is a 67 y.o. female. This pleasant patient is here today with her  today regarding skin lesions. She has no personal or family history of skin cancer . She has never smoked or chewed tobacco.       Seborrheic keratoses  Patient has itchy lesions on her back that have been present for some time.       Allergies: Tape    Current Outpatient Prescriptions   Medication Sig Dispense Refill   • Losartan Potassium (COZAAR PO) Take  by mouth.     • Esomeprazole Magnesium (NEXIUM PO) Take  by mouth.     • levetiracetam (KEPPRA) 500 MG TABLET SR 24 HR 1 tab qhs for 1 week, then 2 tab qhs 60 Tab 4   • irbesartan (AVAPRO) 300 MG Tab Take 1 Tab by mouth every day. 90 Tab 3   • aspirin (ASA) 81 MG Chew Tab chewable tablet Take 81 mg by mouth every day.       No current facility-administered medications for this visit.                        Review of Systems   Constitutional: Negative for fever, chills, weight loss and malaise/fatigue.   .   Skin: itchy lesions on her back  Exam: Blood pressure 120/70, pulse 60, temperature 36.1 °C (97 °F), height 1.549 m (5' 1\"), weight 54.4 kg (120 lb).  General: Normal appearing. No distress.  An examination was performed including the scalp( including the hair) , head and face, inspection of eyelids, lips , right and left ear externally but not ear canals.  Neck and chest and back.  Including  Both upper and lower extremities, including hands and feet and nails and between fingers and toes.     All areas were found to be within normal limits except as noted in the description below.     Multiple seborrheic keratosis including the itchy lesion she indicates at her mid back.   Patient consents to treatment with liquid nitrogen she is aware this can cause hypo-or hyperpigmentation erythema and/or blistering. 12 seborrheic keratosis retreated with liquid nitrogen.    Cherry angiomas noted " over trunk.   Papule on lower extremity.                 Please note that this dictation was created using voice recognition software. I have made every reasonable attempt to correct obvious errors, but I expect that there are errors of grammar and possibly content that I did not discover before finalizing the note.      Assessment/Plan  1. Seborrheic keratoses      we discussed these are benign persistent lesions . they will likely need to be retreated frequently and will not resolve.    2. Cherry angioma      We discussed these are benign lesions requiring no treatment.   3. Papule      We discussed these are benign lesions require no treatment   Patient is given written information on sunscreen encouraged use sunscreen daily. She is given written information on self screening for melanoma from the [employer].  Follow-up as needed

## 2018-06-01 ASSESSMENT — ENCOUNTER SYMPTOMS
COUGH: 0
SHORTNESS OF BREATH: 0
BACK PAIN: 0
NECK PAIN: 0
SENSORY CHANGE: 0
DIARRHEA: 0
NERVOUS/ANXIOUS: 0
CHILLS: 0
SPEECH CHANGE: 0
VOMITING: 0
ABDOMINAL PAIN: 0
FEVER: 0
NAUSEA: 0
EYE DISCHARGE: 0
EYE REDNESS: 0
SPUTUM PRODUCTION: 0

## 2018-06-14 ENCOUNTER — OFFICE VISIT (OUTPATIENT)
Dept: NEUROLOGY | Facility: MEDICAL CENTER | Age: 68
End: 2018-06-14
Payer: MEDICARE

## 2018-06-14 VITALS
HEIGHT: 61 IN | BODY MASS INDEX: 22.62 KG/M2 | OXYGEN SATURATION: 96 % | HEART RATE: 82 BPM | WEIGHT: 119.82 LBS | SYSTOLIC BLOOD PRESSURE: 140 MMHG | RESPIRATION RATE: 16 BRPM | TEMPERATURE: 98.1 F | DIASTOLIC BLOOD PRESSURE: 74 MMHG

## 2018-06-14 DIAGNOSIS — G40.209 LOCALIZATION-RELATED PARTIAL EPILEPSY WITH COMPLEX PARTIAL SEIZURES (HCC): ICD-10-CM

## 2018-06-14 PROCEDURE — 99214 OFFICE O/P EST MOD 30 MIN: CPT | Performed by: PSYCHIATRY & NEUROLOGY

## 2018-06-14 ASSESSMENT — ENCOUNTER SYMPTOMS
SEIZURES: 1
LOSS OF CONSCIOUSNESS: 1

## 2018-06-14 ASSESSMENT — PATIENT HEALTH QUESTIONNAIRE - PHQ9: CLINICAL INTERPRETATION OF PHQ2 SCORE: 0

## 2018-06-14 NOTE — PROGRESS NOTES
"Subjective:      Kelly Guerra is a 67 y.o. female who presents with her , for follow-up, with a history of events suggestive of focal seizures with altered sensorium, and whose EEG was consistent with underlying irritability.    HPI    Since last seen, she has had several seizures recur, the last on May 7, and at that point there was still loss of consciousness. With each of these she had no warning. She recovered quickly and completely, without sequelae. There still is no clear pattern as to the onset of these events in terms of provoking circumstance, time of day, etc.    Repeat EEG did reveal bitemporal irritative features, there was no sustained epileptiform activity in association. She had been contacted to start an antiepileptic, but through miscommunication, the drug was never started. As a result, she has had 3 subsequent seizures as described above. Her MRI imaging studies have been historically unremarkable for significant structural pathology, reviewed these images as well.    Medical, surgical and family histories are reviewed in electronic health record, there are no new drug allergies. She is on Avapro 300 mg daily.    Review of Systems   Neurological: Positive for seizures and loss of consciousness.   All other systems reviewed and are negative.       Objective:     /74   Pulse 82   Temp 36.7 °C (98.1 °F)   Resp 16   Ht 1.549 m (5' 1\")   Wt 54.3 kg (119 lb 13.1 oz)   SpO2 96%   BMI 22.64 kg/m²      Physical Exam    She appears in no acute distress. Her vital signs are stable. There is no malar rash or temporal tenderness. The neck is supple, range of motion is full. Cardiac evaluation is unremarkable. Including mental status, cranial nerves, motor, and coordination examinations, the examination is the most part unchanged and intact.     Assessment/Plan:     1. 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 I apologize for the mistake in not getting the word to her more quickly. Keppra  mg in the evening will be started and after one week increase to 1000 mg. Side effects were reviewed. What she's been on the drug for about 6 months or so, another EEG study can be done.    Most of the appointment was spent discussing the nature of epileptic disorders versus symptomatic seizures and how treatments are different. We talked about her prognosis over time, assuming good seizure control, the rationale for repeat EEG studies being done, and how epilepsy can develop in adults despite normal imaging and other diagnostic studies. We talked about circumstances that can lower her threshold for seizures, he also talked about circumstances under which she needs to go to an emergency room for seizure recurrences. We also talked about phone contact for seizure recurrences to determine whether or not we need to adjust her medication in the interim. Given that her last event was on May 7, 2018, she will be legally safe to drive on August 7. She was also told that she must obtain some type of medical identification to be worn 24/7, and the rationale for this.    Follow-up appointment will be scheduled in 6 months, at that point another EEG can be scheduled. Phone contact in the interim to adjust medications and/or if they need to talk with me about seizure recurrences.    Time: Evaluation of 25 minutes for exam, review, discussion, and education  Discussion: As mentioned in the assessment, over 80% of the time spent face-to-face counseling and coordination care

## 2018-08-18 ENCOUNTER — OFFICE VISIT (OUTPATIENT)
Dept: URGENT CARE | Facility: PHYSICIAN GROUP | Age: 68
End: 2018-08-18
Payer: MEDICARE

## 2018-08-18 ENCOUNTER — HOSPITAL ENCOUNTER (OUTPATIENT)
Dept: RADIOLOGY | Facility: MEDICAL CENTER | Age: 68
End: 2018-08-18
Attending: NURSE PRACTITIONER
Payer: MEDICARE

## 2018-08-18 VITALS
OXYGEN SATURATION: 95 % | SYSTOLIC BLOOD PRESSURE: 138 MMHG | RESPIRATION RATE: 16 BRPM | WEIGHT: 125 LBS | HEIGHT: 61 IN | BODY MASS INDEX: 23.6 KG/M2 | HEART RATE: 78 BPM | DIASTOLIC BLOOD PRESSURE: 82 MMHG | TEMPERATURE: 97.7 F

## 2018-08-18 DIAGNOSIS — R06.02 SOB (SHORTNESS OF BREATH): ICD-10-CM

## 2018-08-18 DIAGNOSIS — J02.9 SORE THROAT: ICD-10-CM

## 2018-08-18 DIAGNOSIS — J06.9 URI WITH COUGH AND CONGESTION: ICD-10-CM

## 2018-08-18 DIAGNOSIS — R05.8 PRODUCTIVE COUGH: ICD-10-CM

## 2018-08-18 LAB
INT CON NEG: NORMAL
INT CON POS: NORMAL
S PYO AG THROAT QL: NEGATIVE

## 2018-08-18 PROCEDURE — 87880 STREP A ASSAY W/OPTIC: CPT | Performed by: NURSE PRACTITIONER

## 2018-08-18 PROCEDURE — 71046 X-RAY EXAM CHEST 2 VIEWS: CPT

## 2018-08-18 PROCEDURE — 99214 OFFICE O/P EST MOD 30 MIN: CPT | Performed by: NURSE PRACTITIONER

## 2018-08-18 RX ORDER — CEFUROXIME AXETIL 250 MG/1
250 TABLET ORAL 2 TIMES DAILY
Qty: 20 TAB | Refills: 0 | Status: SHIPPED | OUTPATIENT
Start: 2018-08-18 | End: 2018-12-11

## 2018-08-18 RX ORDER — ALBUTEROL SULFATE 90 UG/1
2 AEROSOL, METERED RESPIRATORY (INHALATION) EVERY 6 HOURS PRN
Qty: 8.5 G | Refills: 0 | Status: SHIPPED | OUTPATIENT
Start: 2018-08-18 | End: 2018-12-11

## 2018-08-18 NOTE — PROGRESS NOTES
"Subjective:      Kelly Guerra is a 68 y.o. female who presents with Cough (sore throat, ear pain x 5 days)            HPI  Kelly is here for sore throat, bilat ear pain and productive cough x 1 week. C/o SOB with cough, chest tightness, no wheeze.     PMH:  has a past medical history of Acute pain of left knee (4/26/2018); HTN (hypertension); Seborrheic keratoses (5/30/2018); and Seizure (HCC).  MEDS:   Current Outpatient Prescriptions:   •  cefUROXime (CEFTIN) 250 MG Tab, Take 1 Tab by mouth 2 times a day., Disp: 20 Tab, Rfl: 0  •  albuterol 108 (90 Base) MCG/ACT Aero Soln inhalation aerosol, Inhale 2 Puffs by mouth every 6 hours as needed for Shortness of Breath., Disp: 8.5 g, Rfl: 0  •  levetiracetam (KEPPRA) 500 MG TABLET SR 24 HR, 1 tab qhs for 1 week, then 2 tab qhs, Disp: 60 Tab, Rfl: 4  •  irbesartan (AVAPRO) 300 MG Tab, Take 1 Tab by mouth every day., Disp: 90 Tab, Rfl: 3  ALLERGIES:   Allergies   Allergen Reactions   • Tape      Paper tape okay     SURGHX:   Past Surgical History:   Procedure Laterality Date   • COLONOSCOPY  6/2012    clear - 5 years     SOCHX:  reports that she has never smoked. She has never used smokeless tobacco. She reports that she does not drink alcohol or use drugs.  FH: Family history was reviewed, no pertinent findings to report    ROS       Objective:     /82   Pulse 78   Temp 36.5 °C (97.7 °F)   Resp 16   Ht 1.549 m (5' 1\")   Wt 56.7 kg (125 lb)   SpO2 95%   BMI 23.62 kg/m²      Physical Exam       CXR FINDINGS:  There is linear atelectasis within the left lingula.  The heart is normal in size.  There is no evidence of pleural effusion.  There is mild wedging of upper thoracic vertebrae and one bridging syndesmophyte.     Assessment/Plan:     1. Sore throat  ***  - POCT Rapid Strep A    2. Productive cough  ***  - DX-CHEST-2 VIEWS; Future    3. URI with cough and congestion  ***  - cefUROXime (CEFTIN) 250 MG Tab; Take 1 Tab by mouth 2 times a day.  Dispense: 20 " Tab; Refill: 0    4. SOB (shortness of breath)  ***  - albuterol 108 (90 Base) MCG/ACT Aero Soln inhalation aerosol; Inhale 2 Puffs by mouth every 6 hours as needed for Shortness of Breath.  Dispense: 8.5 g; Refill: 0

## 2018-08-21 NOTE — PROGRESS NOTES
Subjective:      Kelly Guerra is a 68 y.o. female who presents with Cough (sore throat, ear pain x 5 days)            HPI  Kelly is here for sore throat, bilat ear pain and productive cough x 1 week. C/o SOB with cough, chest tightness, no wheeze.  present. States just came home a week ago from [place] trip. No OTC meds used due to HTN history. C/o nasal congestion, PND. Fatigue.    PMH:  has a past medical history of Acute pain of left knee (4/26/2018); HTN (hypertension); Seborrheic keratoses (5/30/2018); and Seizure (HCC).  MEDS:   Current Outpatient Prescriptions:   •  cefUROXime (CEFTIN) 250 MG Tab, Take 1 Tab by mouth 2 times a day., Disp: 20 Tab, Rfl: 0  •  albuterol 108 (90 Base) MCG/ACT Aero Soln inhalation aerosol, Inhale 2 Puffs by mouth every 6 hours as needed for Shortness of Breath., Disp: 8.5 g, Rfl: 0  •  levetiracetam (KEPPRA) 500 MG TABLET SR 24 HR, 1 tab qhs for 1 week, then 2 tab qhs, Disp: 60 Tab, Rfl: 4  •  irbesartan (AVAPRO) 300 MG Tab, Take 1 Tab by mouth every day., Disp: 90 Tab, Rfl: 3  ALLERGIES:   Allergies   Allergen Reactions   • Tape      Paper tape okay     SURGHX:   Past Surgical History:   Procedure Laterality Date   • COLONOSCOPY  6/2012    clear - 5 years     SOCHX:  reports that she has never smoked. She has never used smokeless tobacco. She reports that she does not drink alcohol or use drugs.  FH: Family history was reviewed, no pertinent findings to report    Review of Systems   Constitutional: Positive for malaise/fatigue. Negative for chills and fever.   HENT: Positive for congestion, ear pain and sore throat. Negative for sinus pain.    Eyes: Negative for discharge and redness.   Respiratory: Positive for cough, sputum production and shortness of breath. Negative for wheezing.    Cardiovascular: Negative for chest pain, palpitations, orthopnea and leg swelling.   Gastrointestinal: Negative for abdominal pain, constipation, diarrhea, nausea and vomiting.  "  Musculoskeletal: Negative for back pain and myalgias.   Skin: Negative for itching and rash.   Neurological: Negative for dizziness, weakness and headaches.   Endo/Heme/Allergies: Negative for environmental allergies.   All other systems reviewed and are negative.         Objective:     /82   Pulse 78   Temp 36.5 °C (97.7 °F)   Resp 16   Ht 1.549 m (5' 1\")   Wt 56.7 kg (125 lb)   SpO2 95%   BMI 23.62 kg/m²      Physical Exam   Constitutional: She is oriented to person, place, and time. She appears well-developed and well-nourished. She is active and cooperative.  Non-toxic appearance. She does not have a sickly appearance. She does not appear ill. No distress.   HENT:   Head: Normocephalic.   Right Ear: External ear and ear canal normal. Tympanic membrane is injected.   Left Ear: External ear and ear canal normal. Tympanic membrane is injected.   Nose: Mucosal edema and rhinorrhea present. No sinus tenderness.   Mouth/Throat: Uvula is midline. Mucous membranes are dry. No uvula swelling. Posterior oropharyngeal erythema present.   Eyes: Pupils are equal, round, and reactive to light. Conjunctivae and EOM are normal.   Neck: Normal range of motion. Neck supple.   Cardiovascular: Normal rate, regular rhythm and normal heart sounds.    Pulmonary/Chest: Effort normal and breath sounds normal. No accessory muscle usage. No respiratory distress. She has no decreased breath sounds. She has no wheezes. She has no rhonchi. She has no rales.   Musculoskeletal: Normal range of motion.   Lymphadenopathy:     She has no cervical adenopathy.   Neurological: She is alert and oriented to person, place, and time.   Skin: Skin is warm and dry. She is not diaphoretic.   Vitals reviewed.         CXR FINDINGS:  There is linear atelectasis within the left lingula.  The heart is normal in size.  There is no evidence of pleural effusion.  There is mild wedging of upper thoracic vertebrae and one bridging syndesmophyte.   "   Assessment/Plan:     1. Sore throat    - POCT Rapid Strep A: NEG    2. Productive cough    - DX-CHEST-2 VIEWS; Future    3. URI with cough and congestion    - cefUROXime (CEFTIN) 250 MG Tab; Take 1 Tab by mouth 2 times a day.  Dispense: 20 Tab; Refill: 0    4. SOB (shortness of breath)    - albuterol 108 (90 Base) MCG/ACT Aero Soln inhalation aerosol; Inhale 2 Puffs by mouth every 6 hours as needed for Shortness of Breath.  Dispense: 8.5 g; Refill: 0    Increase water intake  May use Ibuprofen/Tylenol prn for fever or body aches  Get rest  May use daily longer acting antihistamine prn  May use saline nasal spray prn to flush nasal congestion  Use inhaler prn for SOB with cough  May use Mucinex(plain) prn for expectorant  Monitor for fevers, increased productive cough, SOB, CP, chest tightness- need re-evaluation

## 2018-11-01 DIAGNOSIS — G40.209 LOCALIZATION-RELATED PARTIAL EPILEPSY WITH COMPLEX PARTIAL SEIZURES (HCC): ICD-10-CM

## 2018-11-01 RX ORDER — LEVETIRACETAM 500 MG/1
1000 TABLET, FILM COATED, EXTENDED RELEASE ORAL DAILY
Qty: 60 TAB | Refills: 11 | Status: SHIPPED | OUTPATIENT
Start: 2018-11-01 | End: 2018-12-14 | Stop reason: SDUPTHER

## 2018-11-30 DIAGNOSIS — E78.5 HYPERLIPIDEMIA, UNSPECIFIED HYPERLIPIDEMIA TYPE: ICD-10-CM

## 2018-12-05 ENCOUNTER — TELEPHONE (OUTPATIENT)
Dept: CARDIOLOGY | Facility: MEDICAL CENTER | Age: 68
End: 2018-12-05

## 2018-12-05 NOTE — TELEPHONE ENCOUNTER
[facility]  Address: [address] [place] [id number]  Phone: [phone]  Fax: [phone]    [facility]. Patient will get the labs done tomorrow. I confirmed her appointment with Dr. Love 12/07/18 @ 1040am check-in.

## 2018-12-07 ENCOUNTER — OFFICE VISIT (OUTPATIENT)
Dept: CARDIOLOGY | Facility: MEDICAL CENTER | Age: 68
End: 2018-12-07
Payer: MEDICARE

## 2018-12-07 VITALS
BODY MASS INDEX: 24.62 KG/M2 | WEIGHT: 130.4 LBS | HEART RATE: 72 BPM | SYSTOLIC BLOOD PRESSURE: 160 MMHG | HEIGHT: 61 IN | DIASTOLIC BLOOD PRESSURE: 72 MMHG | OXYGEN SATURATION: 95 %

## 2018-12-07 DIAGNOSIS — I10 ESSENTIAL HYPERTENSION: ICD-10-CM

## 2018-12-07 DIAGNOSIS — I25.10 CORONARY ARTERY DISEASE, NON-OCCLUSIVE: ICD-10-CM

## 2018-12-07 DIAGNOSIS — E78.5 DYSLIPIDEMIA: ICD-10-CM

## 2018-12-07 DIAGNOSIS — I49.3 PVCS (PREMATURE VENTRICULAR CONTRACTIONS): ICD-10-CM

## 2018-12-07 LAB
CHOLEST SERPL-MCNC: 218 MG/DL (ref 100–199)
HDLC SERPL-MCNC: 59 MG/DL
LABORATORY COMMENT REPORT: ABNORMAL
LDLC SERPL CALC-MCNC: 139 MG/DL (ref 0–99)
TRIGL SERPL-MCNC: 101 MG/DL (ref 0–149)
VLDLC SERPL CALC-MCNC: 20 MG/DL (ref 5–40)

## 2018-12-07 PROCEDURE — 99214 OFFICE O/P EST MOD 30 MIN: CPT | Performed by: INTERNAL MEDICINE

## 2018-12-07 RX ORDER — ROSUVASTATIN CALCIUM 5 MG/1
5 TABLET, COATED ORAL EVERY EVENING
Qty: 30 TAB | Refills: 11 | Status: SHIPPED | OUTPATIENT
Start: 2018-12-07 | End: 2019-03-11

## 2018-12-07 ASSESSMENT — ENCOUNTER SYMPTOMS
LOSS OF CONSCIOUSNESS: 0
SHORTNESS OF BREATH: 0
DIZZINESS: 0
PALPITATIONS: 0
MYALGIAS: 0
COUGH: 0

## 2018-12-07 NOTE — PROGRESS NOTES
"Chief Complaint   Patient presents with   • Coronary Artery Disease     x1yr. f/v    • HTN (Uncontrolled)   • Premature Ventricular Contractions (PVCs)       Subjective:   Kelly Guerra is a 68 y.o. female who presents today for follow-up evaluation of \"nonobstructive CAD, hypertension, hyperlipidemia, PVCs and carotid artery disease and a history of persistent \"dizzy spells\".     Last seen on 12/6/2017.    Since 12/6/2017 appointment patient's had no cardiac symptoms.  She and her  have gone on 3 cruises over the past year and just returned from a two-week cruise in the [facility].  As monitor blood pressure and is run in the 130/60 range.  Mother passed away earlier this year.  I recommended starting statin therapy but the patient declined concerned about cognitive function on both Keppra and statin therapy.  He has had no further seizures on Keppra.     Since 5/3/2017 appointment the patient was hospitalized on 11/17/2017 with a grand mal seizure.  Negative neurological evaluation.  Has been seen by Dr. Clovis Oliveira, neurologist and has had a EEG and had a brain MRI yesterday results pending.  The patient's continue to have \"dizzy spells\" from the description of her  whose monitor these closely. Pretty typical of petit mall seizures with brief staring and some lip movement in addition the patient oftentimes will have nausea preceding these episodes.  The patient refuses to take Lipitor and also stopped aspirin.     Since 8/17/2016 appointment had a tilt table test that was normal.  The patient opted, on her own not to start Lipitor.  She saw her PCP Dr. Anna Charles and was supposed to be seen by ENT but for some reason her appointment did not go through.  Her dizzy spell episodes are unchanged.  They sometimes occur after she starts eating.  He also and predominantly occur with changing head positions such as moving around or sitting in the car looking in both directions.  In 2007 had a ruptured " "right eardrum procedure by Dr. Buddy Aguilar, ENT.     Past medical history  At 8/17/2016 appointment she continued to have episodic dizzy spells.  Her episodes are unpredictable.  3 weeks ago she states she had \"20 episodes in 6 days\" she's had none in the last 2 weeks.  She feels as if she has to stop what she is doing and sit down.  She has some associated nausea.  No distinct fugue-like state but possibly has some vague confusion.  Had previously seen a neurologist who stated that she was \"okay\".  No seizure activity.  Never had syncope.  No palpitations.  States blood pressure when checked runs 148/92.  Has been on a variety of antihypertensives therapy in the past including Diovan and Avapro which she feels does the best job.  She cannot recall what her other blood pressure medicines were.     Past medical history  Beginning in September, 2014 the patient has had recurrent episodes of dizziness.  Initially in September, 2013 she gone to an elementary school program for her presentation.  When she initially got out of the car she had profound dizziness but severe nausea. She went home and did not attend the program.  Since that time she's had monthly episodes of similar symptoms.     In August, 2015 the patient had a lot of episodes of dizziness and nausea.  The last 4 to days she's had 9 episodes.     In the interim she's been seen by ENT with no specific diagnosis.     She describes her episodes of dizziness as occurring spontaneously, fleeting with no vertigo and as \"I'm going to fall down\". She has to hold onto something otherwise she feels that she's going to fall. She has profound fatigue afterwards.     The patient denies palpitations chest pain or shortness of breath.     She's been treated for chronic hypertension.  No treatment for hyperlipidemia.  Head remote cardiac catheterization 2000 at [facility] that showed mild coronary artery disease.  Never smoked cigarettes. Denies diabetes " mellitus.     Family history  Father suffered a stroke at age 55.  Father and brother have hypertension.     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  Does housework and yardwork.  . Children.  Does not drink alcohol.    Past Medical History:   Diagnosis Date   • Acute pain of left knee 4/26/2018   • HTN (hypertension)    • Seborrheic keratoses 5/30/2018   • Seizure (HCC)      Past Surgical History:   Procedure Laterality Date   • COLONOSCOPY  6/2012    clear - 5 years     Family History   Problem Relation Age of Onset   • Hypertension Mother    • Stroke Father      Social History     Social History   • Marital status:      Spouse name: N/A   • Number of children: N/A   • Years of education: N/A     Occupational History   • Not on file.     Social History Main Topics   • Smoking status: Never Smoker   • Smokeless tobacco: Never Used   • Alcohol use No   • Drug use: No   • Sexual activity: Yes     Partners: Male     Other Topics Concern   • Not on file     Social History Narrative   • No narrative on file     Allergies   Allergen Reactions   • Tape      Paper tape okay     Outpatient Encounter Prescriptions as of 12/7/2018   Medication Sig Dispense Refill   • rosuvastatin (CRESTOR) 5 MG Tab Take 1 Tab by mouth every evening. 30 Tab 11   • levetiracetam (KEPPRA) 500 MG TABLET SR 24 HR Take 2 Tabs by mouth every day. 60 Tab 11   • irbesartan (AVAPRO) 300 MG Tab Take 1 Tab by mouth every day. 90 Tab 3   • cefUROXime (CEFTIN) 250 MG Tab Take 1 Tab by mouth 2 times a day. 20 Tab 0   • albuterol 108 (90 Base) MCG/ACT Aero Soln inhalation aerosol Inhale 2 Puffs by mouth every 6 hours as needed for Shortness of Breath. 8.5 g 0     No facility-administered encounter medications on file as of 12/7/2018.      Review of Systems   Respiratory: Negative for cough and shortness of breath.    Cardiovascular: Negative for chest pain and palpitations.   Musculoskeletal: Negative for myalgias.   Neurological: Negative  "for dizziness and loss of consciousness.        Objective:   /72 (BP Location: Left arm, Patient Position: Sitting, BP Cuff Size: Adult)   Pulse 72   Ht 1.549 m (5' 1\")   Wt 59.1 kg (130 lb 6.4 oz)   SpO2 95%   BMI 24.64 kg/m²     Physical Exam   Constitutional: She is oriented to person, place, and time. She appears well-developed and well-nourished. No distress.   Neck: No JVD present.   Cardiovascular: Normal rate, regular rhythm, normal heart sounds and intact distal pulses.  Exam reveals no gallop and no friction rub.    No murmur heard.  Pulmonary/Chest: Effort normal and breath sounds normal. No respiratory distress. She has no wheezes. She has no rales.   Abdominal: Soft. She exhibits no distension and no mass. There is no tenderness.   Musculoskeletal: She exhibits no edema.   Neurological: She is alert and oriented to person, place, and time.   Skin: Skin is warm and dry.   Psychiatric: She has a normal mood and affect. Her behavior is normal.     10/12/2015 EKG: Normal sinus rhythm, rate 63.     10/08/2000 CARDIAC CATHETERIZATION ([facility])  EF 65%. Normal wall motion.  LAD proximal 30% stenosis.  LAD mid 30% stenosis.  Circumflex artery minimal intimal plaquing distal vessel.  Ramus intermedius vessel normal.  RCA. Normal     10/03/2014 CAROTID ULTRASOUND  Minimal atherosclerotic disease within the left carotid bulb.   Resulting stenosis is much less than 50%.   12/20/2016 CAROTID ULTRASOUND  CONCLUSIONS   Normal bilateral carotid exam.    Very mild plaque of the carotid bifurcation bilaterally.     5/2016 cardiac event recorder: Isolated PVCs. Isolated PACs. Associated with symptoms of dizziness.     Assessment:     1. Coronary artery disease, non-occlusive     2. Essential hypertension     3. Dyslipidemia  LIPID PANEL   4. PVCs (premature ventricular contractions)       Medical Decision Making:  Today's Assessment / Status / Plan:   CAD. Nonobstructive. Clinically without symptoms " "of ischemia. Normal stress echocardiogram 12/2016.     Hypertension. Labile. Chronic.  Controlled as an outpatient.     Hyperlipidemia.       PVCs. Isolated.     Carotid artery disease. Mild. Asymptomatic.     2007. History of ruptured eardrum.     \"Dizzy spells\". Chronic. 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 Grand mal event 11/17/2017     Recommendation:/Plan/Discussion  1.  I reviewed the results of recent lipid panel with the patient.  2.  I again recommended starting statin therapy and ordered Crestor 5 mg daily.  3.  Lipid panel 1 month after starting statin therapy.  4.  Has follow-up appointment with Dr. Oliveira for her seizure disorder in the near future.  5.  Continue to monitor blood pressure.  6.  Has upcoming follow-up appointment with new PCP Dr. Jurado.  7.  Follow-up 1 year    "

## 2018-12-11 ENCOUNTER — OFFICE VISIT (OUTPATIENT)
Dept: MEDICAL GROUP | Facility: PHYSICIAN GROUP | Age: 68
End: 2018-12-11
Payer: MEDICARE

## 2018-12-11 VITALS
SYSTOLIC BLOOD PRESSURE: 146 MMHG | RESPIRATION RATE: 16 BRPM | HEART RATE: 70 BPM | HEIGHT: 61 IN | TEMPERATURE: 98.6 F | WEIGHT: 131 LBS | BODY MASS INDEX: 24.73 KG/M2 | OXYGEN SATURATION: 97 % | DIASTOLIC BLOOD PRESSURE: 80 MMHG

## 2018-12-11 DIAGNOSIS — I10 ESSENTIAL HYPERTENSION: ICD-10-CM

## 2018-12-11 DIAGNOSIS — E78.5 DYSLIPIDEMIA: ICD-10-CM

## 2018-12-11 DIAGNOSIS — R73.03 PREDIABETES: ICD-10-CM

## 2018-12-11 DIAGNOSIS — J30.81 ANIMAL DANDER ALLERGY: ICD-10-CM

## 2018-12-11 DIAGNOSIS — G40.919 INTRACTABLE EPILEPSY WITHOUT STATUS EPILEPTICUS, UNSPECIFIED EPILEPSY TYPE (HCC): ICD-10-CM

## 2018-12-11 DIAGNOSIS — E55.9 VITAMIN D INSUFFICIENCY: ICD-10-CM

## 2018-12-11 PROBLEM — G40.909 EPILEPSY (HCC): Status: ACTIVE | Noted: 2018-12-11

## 2018-12-11 PROCEDURE — 99214 OFFICE O/P EST MOD 30 MIN: CPT | Performed by: INTERNAL MEDICINE

## 2018-12-11 NOTE — ASSESSMENT & PLAN NOTE
Has an allergy to cats and dogs. Has had exposure on planes and in airports when she's been exposed to these animals. Has rhinorrhea, watery eyes, and a bad sore throat that progresses to a chest infection.

## 2018-12-11 NOTE — LETTER
December 11, 2018        Re: Kelly Guerra      To Whom it Concerns,     Please note that Kelly Guerra has a severe animal allergy, particularly to cats and dogs, and needs airline seat accommodations that are as far as possible from any animals that may be on board.       Thank you for your understanding,         Miguel Salomon MD

## 2018-12-11 NOTE — PROGRESS NOTES
PRIMARY CARE CLINIC NEW PATIENT H&P  Chief Complaint   Patient presents with   • Hypertension     Sees cardiologist    • Other     having seizures the last year; upcoming neurology apt   • Other     allergy to cats and dogs      History of Present Illness     Here with her  to establish care and discuss the following:     Epilepsy (HCC)  Her first seizure was 11/2017 at home. She has since established with Dr. Lacy who started her on keppra 6/2018 with no repeat seizures. No repeat seizures since 5/2018.     Essential hypertension  Well controlled on irbesartan 300 mg daily.     Dyslipidemia  Lab Results   Component Value Date/Time    CHOLSTRLTOT 218 (H) 12/06/2018 09:41 AM    CHOLSTRLTOT 197 06/16/2015 09:15 AM     (H) 12/06/2018 09:41 AM     (H) 06/16/2015 09:15 AM    HDL 59 12/06/2018 09:41 AM    HDL 57 06/16/2015 09:15 AM    TRIGLYCERIDE 101 12/06/2018 09:41 AM    TRIGLYCERIDE 89 06/16/2015 09:15 AM     Prescribed crestor 5 mg daily from Dr. Love who she sees annually but she hasn't started taking this yet and wants to ask Dr. Lacy's opinion on this.     Prediabetes  Last A1c was 5.7% as of 10/2017.     Vitamin D insufficiency  Last level around 27 as of 10/2017.     Animal dander allergy  Has an allergy to cats and dogs. Has had exposure on planes and in airports when she's been exposed to these animals. Has rhinorrhea, watery eyes, and a bad sore throat that progresses to a chest infection.     Current Outpatient Prescriptions   Medication Sig Dispense Refill   • rosuvastatin (CRESTOR) 5 MG Tab Take 1 Tab by mouth every evening. 30 Tab 11   • levetiracetam (KEPPRA) 500 MG TABLET SR 24 HR Take 2 Tabs by mouth every day. (Patient taking differently: Take 1,000 mg by mouth 2 Times a Day.) 60 Tab 11   • irbesartan (AVAPRO) 300 MG Tab Take 1 Tab by mouth every day. 90 Tab 3     No current facility-administered medications for this visit.        Past Medical History:   Diagnosis Date  "  • Dyslipidemia 2016   • Epilepsy (HCC) 2018    Regino   • HTN (hypertension)    • Prediabetes 2012     Past Surgical History:   Procedure Laterality Date   • COLONOSCOPY  2012    clear - 5 years   • APPENDECTOMY       Social History   Substance Use Topics   • Smoking status: Never Smoker   • Smokeless tobacco: Never Used   • Alcohol use No     Social History     Social History Narrative    Retired from [employer]'s non-[employer] organization      Family History   Problem Relation Age of Onset   • Hypertension Mother    • Stroke Father      Family Status   Relation Status   • Mo         99   • Fa  at age 51        stroke     Allergies: Tape    ROS  Constitutional: Negative for fatigue/generalized weakness.   HEENT: Negative for  vision changes, hearing changes    Respiratory: Negative for shortness of breath  Cardiovascular: Negative for chest pain, palpitations  Gastrointestinal: Negative for blood in stool, constipation, diarrhea  Genitourinary: Negative for dysuria, polyuria  Musculoskeletal: Negative for myalgias, back pain, and joint pain.   Skin: Negative for rash  Neurological: Negative for numbness, tingling  Psychiatric/Behavioral: Negative for depression, anxiety       Objective   Blood pressure 146/80, pulse 70, temperature 37 °C (98.6 °F), temperature source Temporal, resp. rate 16, height 1.549 m (5' 1\"), weight 59.4 kg (131 lb), SpO2 97 %. Body mass index is 24.75 kg/m².    General: Alert, oriented. In no acute distress   HEET: EOMI, PERRL, conjunctiva non-injected, sclera non-icteric.  Nares patent with no significant congestion or drainage.  [place] pinnae, external auditory canals, TM pearly gray with normal light reflex bilaterally.Oral mucous membranes pink and moist with no lesions.  Neck: supple with no cervical, subclavicular lymphadenopathy, JVD, palpable thyroid nodules   Lungs: clear to auscultation bilaterally with good excursion.  CV: regular rate and " rhythm.  Abdomen soft, non-distended, non-tender with normal bowel sounds. No hepatosplenomegaly, no masses palpated  Skin: no lesions. Warm, dry   Neuro: CN II-XII intact   Psychiatric: appropriate mood and affect     Assessment and Plan   The following treatment plan was discussed     1. Essential hypertension  Continue irbesartan 300 mg daily.     2. Dyslipidemia  She would like to discuss the crestor 5 mg daily prescription from her cardiologist with Dr. Lacy first.     3. Prediabetes  Last A1c was 5.7% as of 10/2017, check repeat fasting sugar.   - COMP METABOLIC PANEL; Future    4. Intractable epilepsy without status epilepticus, unspecified epilepsy type (HCC)  Well controlled on keppra, sees Dr. Lacy next 12/14/2018.   - CBC WITH DIFFERENTIAL; Future  - TSH WITH REFLEX TO FT4; Future  - VITAMIN D,25 HYDROXY; Future    5. Vitamin D insufficiency  Check repeat as above.     6. Animal dander allergy  Supplied her with a letter for travel to accommodate her as far as possible from any pets or service animals that may be on board.       Return in about 3 months (around 3/11/2019).    Health Maintenance      Health Maintenance Due   Topic Date Due   • IMM DTaP/Tdap/Td Vaccine (1 - Tdap) 07/25/1999   • Annual Wellness Visit  07/11/2018       Miguel Salomon MD  Internal Medicine  [facility]

## 2018-12-11 NOTE — ASSESSMENT & PLAN NOTE
Her first seizure was 11/2017 at home. She has since established with Dr. Lacy who started her on keppra 6/2018 with no repeat seizures. No repeat seizures since 5/2018.

## 2018-12-11 NOTE — ASSESSMENT & PLAN NOTE
Lab Results   Component Value Date/Time    CHOLSTRLTOT 218 (H) 12/06/2018 09:41 AM    CHOLSTRLTOT 197 06/16/2015 09:15 AM     (H) 12/06/2018 09:41 AM     (H) 06/16/2015 09:15 AM    HDL 59 12/06/2018 09:41 AM    HDL 57 06/16/2015 09:15 AM    TRIGLYCERIDE 101 12/06/2018 09:41 AM    TRIGLYCERIDE 89 06/16/2015 09:15 AM     Prescribed crestor 5 mg daily from Dr. Love who she sees annually but she hasn't started taking this yet and wants to ask Dr. Lacy's opinion on this.

## 2018-12-12 ENCOUNTER — TELEPHONE (OUTPATIENT)
Dept: MEDICAL GROUP | Facility: PHYSICIAN GROUP | Age: 68
End: 2018-12-12

## 2018-12-12 DIAGNOSIS — R79.89 LOW VITAMIN D LEVEL: ICD-10-CM

## 2018-12-12 NOTE — TELEPHONE ENCOUNTER
Phone Number Called: [phone] (home)     Message: Spoke with Pt in regards to lab being code being changed, Pt states she was very upset with the appointment she had yesterday and I assured her Dr Salomon did everything that was needed appropriately and addressed her immediate concerns. She was wondering why the diagnosis code was the way it was, and I stated there are times where the codes pre populate.   Pt had started crying and mumbling some words, ended up hanging up on me.    Left Message for patient to call back: N\A

## 2018-12-13 LAB
ALBUMIN SERPL-MCNC: 4.2 G/DL (ref 3.6–4.8)
ALBUMIN/GLOB SERPL: 1.7 {RATIO} (ref 1.2–2.2)
ALP SERPL-CCNC: 88 IU/L (ref 39–117)
ALT SERPL-CCNC: 14 IU/L (ref 0–32)
AST SERPL-CCNC: 16 IU/L (ref 0–40)
BASOPHILS # BLD AUTO: 0.1 X10E3/UL (ref 0–0.2)
BASOPHILS NFR BLD AUTO: 1 %
BILIRUB SERPL-MCNC: 0.5 MG/DL (ref 0–1.2)
BUN SERPL-MCNC: 14 MG/DL (ref 8–27)
BUN/CREAT SERPL: 20 (ref 12–28)
CALCIUM SERPL-MCNC: 9.3 MG/DL (ref 8.7–10.3)
CHLORIDE SERPL-SCNC: 105 MMOL/L (ref 96–106)
CO2 SERPL-SCNC: 24 MMOL/L (ref 20–29)
CREAT SERPL-MCNC: 0.71 MG/DL (ref 0.57–1)
EOSINOPHIL # BLD AUTO: 0.1 X10E3/UL (ref 0–0.4)
EOSINOPHIL NFR BLD AUTO: 1 %
ERYTHROCYTE [DISTWIDTH] IN BLOOD BY AUTOMATED COUNT: 13.9 % (ref 12.3–15.4)
GLOBULIN SER CALC-MCNC: 2.5 G/DL (ref 1.5–4.5)
GLUCOSE SERPL-MCNC: 90 MG/DL (ref 65–99)
HCT VFR BLD AUTO: 44 % (ref 34–46.6)
HGB BLD-MCNC: 14.9 G/DL (ref 11.1–15.9)
IF AFRICAN AMERICAN  100797: 100 ML/MIN/1.73
IF NON AFRICAN AMER 100791: 87 ML/MIN/1.73
IMM GRANULOCYTES # BLD: 0 X10E3/UL (ref 0–0.1)
IMM GRANULOCYTES NFR BLD: 0 %
IMMATURE CELLS  115398: ABNORMAL
LYMPHOCYTES # BLD AUTO: 2.1 X10E3/UL (ref 0.7–3.1)
LYMPHOCYTES NFR BLD AUTO: 34 %
MCH RBC QN AUTO: 28.1 PG (ref 26.6–33)
MCHC RBC AUTO-ENTMCNC: 33.9 G/DL (ref 31.5–35.7)
MCV RBC AUTO: 83 FL (ref 79–97)
MONOCYTES # BLD AUTO: 0.3 X10E3/UL (ref 0.1–0.9)
MONOCYTES NFR BLD AUTO: 5 %
MORPHOLOGY BLD-IMP: ABNORMAL
NEUTROPHILS # BLD AUTO: 3.6 X10E3/UL (ref 1.4–7)
NEUTROPHILS NFR BLD AUTO: 59 %
NRBC BLD AUTO-RTO: ABNORMAL %
PLATELET # BLD AUTO: 291 X10E3/UL (ref 150–379)
POTASSIUM SERPL-SCNC: 4.1 MMOL/L (ref 3.5–5.2)
PROT SERPL-MCNC: 6.7 G/DL (ref 6–8.5)
RBC # BLD AUTO: 5.3 X10E6/UL (ref 3.77–5.28)
SODIUM SERPL-SCNC: 142 MMOL/L (ref 134–144)
TSH SERPL DL<=0.005 MIU/L-ACNC: 0.73 UIU/ML (ref 0.45–4.5)
WBC # BLD AUTO: 6 X10E3/UL (ref 3.4–10.8)

## 2018-12-14 ENCOUNTER — OFFICE VISIT (OUTPATIENT)
Dept: NEUROLOGY | Facility: MEDICAL CENTER | Age: 68
End: 2018-12-14
Payer: MEDICARE

## 2018-12-14 VITALS
TEMPERATURE: 97 F | DIASTOLIC BLOOD PRESSURE: 72 MMHG | BODY MASS INDEX: 24.81 KG/M2 | SYSTOLIC BLOOD PRESSURE: 130 MMHG | HEART RATE: 69 BPM | WEIGHT: 131.4 LBS | HEIGHT: 61 IN | OXYGEN SATURATION: 97 % | RESPIRATION RATE: 15 BRPM

## 2018-12-14 DIAGNOSIS — G40.009 PARTIAL IDIOPATHIC EPILEPSY WITH SEIZURES OF LOCALIZED ONSET, NOT INTRACTABLE, WITHOUT STATUS EPILEPTICUS (HCC): Primary | ICD-10-CM

## 2018-12-14 PROCEDURE — 99214 OFFICE O/P EST MOD 30 MIN: CPT | Performed by: PSYCHIATRY & NEUROLOGY

## 2018-12-14 RX ORDER — RANITIDINE 150 MG/1
75 TABLET ORAL
COMMUNITY

## 2018-12-14 RX ORDER — LEVETIRACETAM 500 MG/1
TABLET, FILM COATED, EXTENDED RELEASE ORAL
Qty: 180 TAB | Refills: 3 | Status: SHIPPED | OUTPATIENT
Start: 2018-12-14 | End: 2019-06-14 | Stop reason: SDUPTHER

## 2018-12-14 ASSESSMENT — PAIN SCALES - GENERAL: PAINLEVEL: NO PAIN

## 2018-12-14 ASSESSMENT — ENCOUNTER SYMPTOMS
LOSS OF CONSCIOUSNESS: 0
HEADACHES: 0
NERVOUS/ANXIOUS: 0
MEMORY LOSS: 0
DIZZINESS: 1
DEPRESSION: 0
SEIZURES: 0

## 2018-12-14 NOTE — PROGRESS NOTES
"Subjective:      Kelly Guerra is a 68 y.o. female who presents with her  Neymar, for follow-up, with a history of seizures.     HPI    Since last seen, her last seizure occurring on May 7, 2018 prior to starting Keppra, she is now on Keppra ER 1000 mg every evening, she has had no further events.  The seizures have always been a sudden altered persona with staring and unresponsiveness, May 7 event actually resulted in a secondarily generalized seizure.  Unfortunately on Keppra she has noted some emotional lability, at times irritability, at other times crying at the drop of a hat.  She denies feeling depressed at these moments.  There was some drowsiness with her drug initially, she is doing better with this taking it at nighttime.    Previous EEG tracings did reveal temporal lobe irritability, thus the need for drug.  We are scheduling another EEG now that she has been on drug.  She is compliant.    The episodic vertigo with nausea and instability happened again with her new primary care physician, the episode was brief, but she remains awake and alert.  From a neurologic standpoint, evaluations have been unremarkable.  She has yet to see ENT.    Medical, surgical and family histories are reviewed in the electronic health record, there are no known drug allergies.  She is on Keppra ER 1000 mill grams nightly, Zantac, Crestor and Avapro.    Review of Systems   Constitutional: Negative for malaise/fatigue.   Skin: Negative for itching and rash.   Neurological: Positive for dizziness. Negative for seizures, loss of consciousness and headaches.   Psychiatric/Behavioral: Negative for depression and memory loss. The patient is not nervous/anxious.    All other systems reviewed and are negative.       Objective:     /72 (BP Location: Right arm, Patient Position: Sitting)   Pulse 69   Temp 36.1 °C (97 °F) (Temporal)   Resp 15   Ht 1.549 m (5' 1\")   Wt 59.6 kg (131 lb 6.4 oz)   SpO2 97%   BMI 24.83 kg/m²  "     Physical Exam    She appears in no acute distress.  Vital signs are stable.  There is no malar rash.  The neck is supple, range of motion is full.  Cardiac evaluation reveals a regular rhythm.  There is no evidence of rash.    Including mental status, cranial nerves, musculoskeletal, reflex, coordination, and sensory evaluations, the examination again remains fully intact without evidence of any deficits no toxicities.     Assessment/Plan:     1. Partial idiopathic epilepsy with seizures of localized onset, not intractable, without status epilepticus (HCC)  Repeat EEG will be obtained, compared to the initial studies when she was off medication.  If continued or worsening irritability is seen, we may need to push the Keppra upwards, but we then have to deal with the emotional side effects of the drug is eliciting.  Hopefully a change over to another antiepileptic such as Vimpat, etc., will not be necessary.    We spent a long time talking about the nature of her seizure disorder, the rationale for repeating EEG studies, criteria for adjusting medications, as well as prognostic signs for control and possible discontinuation of medication moving forward.  She was told she would likely need to be on medication for at least 2 years before we might consider getting her off.  This could be longer if she has seizure recurrences, if her EEGs remain abnormal, etc.  We again reviewed circumstances under which my office should be contacted versus going to the emergency room for seizure recurrences.    With the EEG itself, we will call her with the results of they are abnormal and warrant immediate intervention, otherwise we talked specific second next office visit.    - REFERRAL TO NEURODIAGNOSTICS (EEG,EP,EMG/NCS/DBS) Modality Requested: EEG-Video  - levetiracetam (KEPPRA) 500 MG TABLET SR 24 HR; 2 tab every evenning  Dispense: 180 Tab; Refill: 3    Time: 25 minutes spent face-to-face for exam, review, discussion, and  education, of this over 70% of the time spent counseling and coordinating care.

## 2019-03-04 DIAGNOSIS — I10 ESSENTIAL HYPERTENSION: ICD-10-CM

## 2019-03-04 RX ORDER — IRBESARTAN 300 MG/1
300 TABLET ORAL
Qty: 90 TAB | Refills: 3 | Status: SHIPPED | OUTPATIENT
Start: 2019-03-04 | End: 2019-09-05 | Stop reason: RX

## 2019-03-11 ENCOUNTER — OFFICE VISIT (OUTPATIENT)
Dept: MEDICAL GROUP | Facility: PHYSICIAN GROUP | Age: 69
End: 2019-03-11
Payer: MEDICARE

## 2019-03-11 VITALS
OXYGEN SATURATION: 97 % | SYSTOLIC BLOOD PRESSURE: 158 MMHG | WEIGHT: 132.2 LBS | HEART RATE: 72 BPM | DIASTOLIC BLOOD PRESSURE: 72 MMHG | TEMPERATURE: 98.2 F | BODY MASS INDEX: 24.96 KG/M2 | HEIGHT: 61 IN | RESPIRATION RATE: 12 BRPM

## 2019-03-11 DIAGNOSIS — L82.1 SEBORRHEIC KERATOSES: ICD-10-CM

## 2019-03-11 DIAGNOSIS — Z01.84 IMMUNITY STATUS TESTING: ICD-10-CM

## 2019-03-11 DIAGNOSIS — R42 DIZZY SPELLS: ICD-10-CM

## 2019-03-11 PROCEDURE — 99213 OFFICE O/P EST LOW 20 MIN: CPT | Performed by: INTERNAL MEDICINE

## 2019-03-11 NOTE — ASSESSMENT & PLAN NOTE
Has been having dizzy spells associated with nausea for the past 4 years or so. She will have 8-10 dizzy spells within 4-5 weeks. Her most recent episode was 1/18/2019. Her neurologist insists that these spells do not have anything to do with her seizure etiology. She feels like she's going to fall to the right. The room doesn't spin. She first gets sick to her stomach but never vomits. After that she feels like she's going to fall to her right. Has stopped driving on the freeway since she's afraid of these episodes. Occasionally has a right lower quadrant tenderness but doesn't think that this recurring symptom has anything to do with her dizzy spells.

## 2019-03-11 NOTE — PROGRESS NOTES
PRIMARY CARE CLINIC FOLLOW UP VISIT  Chief Complaint   Patient presents with   • Other     mole check   • Vertigo     f/v   • Other     discuss vaccinations     History of Present Illness     Dizzy spells  Has been having dizzy spells associated with nausea for the past 4 years or so. She will have 8-10 dizzy spells within 4-5 weeks. Her most recent episode was 1/18/2019. Her neurologist insists that these spells do not have anything to do with her seizure etiology. She feels like she's going to fall to the right. The room doesn't spin. She first gets sick to her stomach but never vomits. After that she feels like she's going to fall to her right. Has stopped driving on the freeway since she's afraid of these episodes. Occasionally has a right lower quadrant tenderness but doesn't think that this recurring symptom has anything to do with her dizzy spells.     Seborrheic keratoses  She has a history of SKs that she had frozen with dermatology in the past. Now has another large lesion of her back that bothers her and is itchy.     Immunity status testing  She says that she didn't get MMR vaccination growing up when she was in [place]. She travels a lot and is sometimes in third world countries. She would like to be vaccinated against measles. 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     Current Outpatient Prescriptions   Medication Sig Dispense Refill   • irbesartan (AVAPRO) 300 MG Tab Take 1 Tab by mouth every day. 90 Tab 3   • levetiracetam (KEPPRA) 500 MG TABLET SR 24 HR 2 tab every evenning 180 Tab 3   • raNITidine (ZANTAC) 150 MG Tab Take 150 mg by mouth 2 times a day.       No current facility-administered medications for this visit.      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 All other systems reviewed and negative.        Objective   Blood pressure 158/72, pulse 72, temperature 36.8 °C (98.2 °F), temperature source Temporal, resp. rate 12, height 1.549 m (5' 1\"), weight 60 kg (132 lb 3.2 oz), SpO2 97 %. Body mass index is 24.98 kg/m².    General: alert and oriented, pleasant, cooperative  HEENT: Normocephalic, atraumatic.   Skin: large benign brown colored mole of mid back  Psychiatric: anxious    Assessment and Plan   The following treatment plan was discussed     1. Dizzy spells  Unclear etiology, may be related to anxiety? Offered anti-emetics but she declined for now. Her neurologist doesn't think it's related to anxiety. Her MRI brain was normal in 2017 and she has repeat pending 2019, we will follow this up.     2. Seborrheic keratoses  The large mole appears benign, she will follow up with her dermatology office for removal.     3. Immunity status testing  - MEASLES/MUMPS/RUBELLA IMMUNITY; Future  - VARICELLA ZOSTER ANTIBODIES (IGG + IGM); Future      Healthcare maintenance     Health Maintenance Due   Topic Date Due   • IMM DTaP/Tdap/Td Vaccine (1 - Tdap) 1999   • Annual Wellness Visit  2018       Return in about 3 months (around 2019).    Miguel Salomon MD  Internal Medicine  [facility]                   "

## 2019-03-11 NOTE — ASSESSMENT & PLAN NOTE
She says that she didn't get MMR vaccination growing up when she was in [place]. She travels a lot and is sometimes in third world countries. She would like to be vaccinated against measles. 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

## 2019-03-11 NOTE — ASSESSMENT & PLAN NOTE
She has a history of SKs that she had frozen with dermatology in the past. Now has another large lesion of her back that bothers her and is itchy.

## 2019-03-27 ENCOUNTER — TELEPHONE (OUTPATIENT)
Dept: CARDIOLOGY | Facility: MEDICAL CENTER | Age: 69
End: 2019-03-27

## 2019-03-27 NOTE — TELEPHONE ENCOUNTER
"took too much Irbesartan for 3 days   Received: Today Message Contents   MIO Cordero/Shila     Patient has question about her Irbesartan medication. She said she has mistaken it with her anti-anxiety medication and she's taken 900 MGs a day for the last 3 days. She can be reached at [phone].      Returned patient call.  Pt states, \" I feel ok.  I'm feeling a little tired, but I'm ok.  I haven't taken my BP today but last night my BP was 187/90.  I did have a seizure last night, but I started getting them 15 months ago when I was caring for my mom and had lack of sleep.\"  Pt denies light-headedness, dizziness, and pain.  Encourage patient to take medication as prescribed.  Instructed patient to monitor for symptoms and blood pressure.  She verbalizes understanding and is appreciative of information given.    "

## 2019-04-25 ENCOUNTER — NON-PROVIDER VISIT (OUTPATIENT)
Dept: NEUROLOGY | Facility: MEDICAL CENTER | Age: 69
End: 2019-04-25
Payer: MEDICARE

## 2019-04-25 DIAGNOSIS — G40.009 PARTIAL IDIOPATHIC EPILEPSY WITH SEIZURES OF LOCALIZED ONSET, NOT INTRACTABLE, WITHOUT STATUS EPILEPTICUS (HCC): ICD-10-CM

## 2019-04-25 PROCEDURE — 95951 EEG: CPT | Mod: 52 | Performed by: PSYCHIATRY & NEUROLOGY

## 2019-04-25 NOTE — PROCEDURES
VIDEO ELECTROENCEPHALOGRAM REPORT      Referring provider: Dr. Salomon    DOS: April 25, 2019 of 30-minute duration.    INDICATION:  Kelly Guerra 68 y.o. female presenting with a history of focal seizures suggestive of complex partial nature.  EEG is requested as follow-up study after initiation of AED therapy and resolution of seizures.    CURRENT ANTIEPILEPTIC REGIMEN: Keppra SR 1000 mg nightly.    TECHNIQUE: 30 channel video electroencephalogram (EEG) was performed in accordance with the international 10-20 system. The study was reviewed in bipolar and referential montages. The recording examined the patient during wakeful and drowsy/sleep state(s).     DESCRIPTION OF THE RECORD:  During the wakefulness, the background showed a symmetrical 10 Hz alpha activity posteriorly with amplitude of 70 mV.  There was reactivity to eye closure/opening.  A normal anterior-posterior gradient was noted with faster beta frequencies seen anteriorly.  During drowsiness, theta/delta frequencies were seen.  There is seen bitemporal rhythmic slowing, consisting of sharply contoured theta activity, occasional phase reversals over the T3 and T4 electrodes.  This occurs independently but only rarely synchronously.  There is no change in the patient's behavior seen on video.  No electroencephalographic seizure activity is seen.    During the sleep state, background shows diffuse high-amplitude 4-5 Hz delta activity.  Symmetrical high-amplitude sleep spindles and vertex sharps were seen in the leads over the central regions.     ACTIVATION PROCEDURES:   Hyperventilation was performed by the patient for a total of 3 minutes. The technician performing the test noted good effort. This technique produced electroencephalographic changes in keeping with the expected bilaterally synchronous, frontally predominant, high amplitude slow waves build up.  No activation occurred.    Intermittent Photic stimulation was performed in a stepwise fashion  from 1 to 30 Hz and elicited a normal response (photic driving), most noticeable in the posterior leads.  No activation occurred.    ICTAL AND/OR INTERICTAL FINDINGS:   No focal or generalized epileptiform activity noted. No regional slowing was seen during this routine study.  No clinical events or seizures were reported or recorded during the study.     EKG: sampling of the EKG recording demonstrated sinus rhythm.     EVENTS: None    INTERPRETATION:  This is an abnormal video EEG recording in the awake and drowsy/sleep state(s).  Clinical correlation is recommended.  Bitemporal irritability is seen, at times independent, less commonly synchronous, suggestive of underlying epileptogenic potential.  When compared to previous tracing, the overall picture is improved.    Note: A normal EEG does not rule out epilepsy.  If the clinical suspicion remains high for seizures, a prolonged recording to capture clinical or subclinical events may be helpful.        Clovis Lacy M.D.

## 2019-05-06 ENCOUNTER — OFFICE VISIT (OUTPATIENT)
Dept: URGENT CARE | Facility: PHYSICIAN GROUP | Age: 69
End: 2019-05-06
Payer: MEDICARE

## 2019-05-06 ENCOUNTER — HOSPITAL ENCOUNTER (OUTPATIENT)
Dept: RADIOLOGY | Facility: MEDICAL CENTER | Age: 69
End: 2019-05-06
Attending: PHYSICIAN ASSISTANT
Payer: MEDICARE

## 2019-05-06 VITALS
SYSTOLIC BLOOD PRESSURE: 128 MMHG | BODY MASS INDEX: 24.55 KG/M2 | DIASTOLIC BLOOD PRESSURE: 64 MMHG | HEIGHT: 61 IN | WEIGHT: 130 LBS | RESPIRATION RATE: 12 BRPM | OXYGEN SATURATION: 98 % | HEART RATE: 78 BPM | TEMPERATURE: 97.5 F

## 2019-05-06 DIAGNOSIS — S59.902A INJURY OF LEFT ELBOW, INITIAL ENCOUNTER: ICD-10-CM

## 2019-05-06 DIAGNOSIS — S99.922A INJURY OF TOE ON LEFT FOOT, INITIAL ENCOUNTER: ICD-10-CM
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
## 2019-05-06 PROCEDURE — 99214 OFFICE O/P EST MOD 30 MIN: CPT | Performed by: PHYSICIAN ASSISTANT

## 2019-05-06 PROCEDURE — 73080 X-RAY EXAM OF ELBOW: CPT | Mod: LT

## 2019-05-06 PROCEDURE — 73660 X-RAY EXAM OF TOE(S): CPT | Mod: LT

## 2019-05-06 ASSESSMENT — ENCOUNTER SYMPTOMS
CHILLS: 0
VOMITING: 0
SHORTNESS OF BREATH: 0
FALLS: 1
CONSTIPATION: 0
FEVER: 0
NAUSEA: 0
WEAKNESS: 0
ABDOMINAL PAIN: 0
TINGLING: 0
DIARRHEA: 0
COUGH: 0

## 2019-05-06 NOTE — PROGRESS NOTES
Subjective:   Kelly Guerra is a 68 y.o. female who presents for Pain (x3 days lost balance, swollen small toe on L foot, pain in L elbow, swollen)        The patient lost balance 3 days ago causing her to fall/roll down the driveway.  She is now experiencing left foot pain mostly over the left fifth toe and left elbow pain.  She has tried icing, elevating and Tylenol with some relief.  She does have a previous fracture to the left fifth toe no previous elbow injury.  She does have a history of epilepsy well-controlled with Keppra.  She denies seizure/postictal state.  She denies head injury or loss of consciousness.  No other aggravating or alleviating factors.  This is the first episode of its kind.      Review of Systems   Constitutional: Negative for chills, fever and malaise/fatigue.   Respiratory: Negative for cough and shortness of breath.    Gastrointestinal: Negative for abdominal pain, constipation, diarrhea, nausea and vomiting.   Musculoskeletal: Positive for falls and joint pain.   Neurological: Negative for tingling and weakness.   All other systems reviewed and are negative.      PMH:  has a past medical history of Dyslipidemia (11/18/2016); Epilepsy (HCC) (12/11/2018); HTN (hypertension); and Prediabetes (4/4/2012).  MEDS:   Current Outpatient Prescriptions:   •  irbesartan (AVAPRO) 300 MG Tab, Take 1 Tab by mouth every day., Disp: 90 Tab, Rfl: 3  •  raNITidine (ZANTAC) 150 MG Tab, Take 150 mg by mouth 2 times a day., Disp: , Rfl:   •  levetiracetam (KEPPRA) 500 MG TABLET SR 24 HR, 2 tab every evenning, Disp: 180 Tab, Rfl: 3  ALLERGIES:   Allergies   Allergen Reactions   • Tape      Paper tape okay     SURGHX:   Past Surgical History:   Procedure Laterality Date   • COLONOSCOPY  6/2012    clear - 5 years   • APPENDECTOMY       SOCHX:  reports that she has never smoked. She has never used smokeless tobacco. She reports that she does not drink alcohol or use drugs.  Family History   Problem Relation  "Age of Onset   • Hypertension Mother    • Stroke Father         Objective:   /64 (BP Location: Left arm, Patient Position: Sitting, BP Cuff Size: Adult)   Pulse 78   Temp 36.4 °C (97.5 °F) (Temporal)   Resp 12   Ht 1.549 m (5' 1\")   Wt 59 kg (130 lb)   SpO2 98%   BMI 24.56 kg/m²     Physical Exam   Constitutional: She is oriented to person, place, and time. She appears well-developed and well-nourished. No distress.   HENT:   Head: Normocephalic and atraumatic.   Nose: Nose normal.   Eyes: Pupils are equal, round, and reactive to light. Conjunctivae are normal.   Neck: Normal range of motion. Neck supple. No tracheal deviation present.   Cardiovascular: Normal rate and regular rhythm.    Pulmonary/Chest: Effort normal and breath sounds normal.   Musculoskeletal:        Left forearm: She exhibits tenderness and bony tenderness. She exhibits no swelling and no edema.        Arms:       Feet:    ROM, strength, tone intact bilateral upper extremity.  Negative Neer's, Empty Can, Medel Paolo test.   Neurological: She is alert and oriented to person, place, and time.   Skin: Skin is warm and dry. Capillary refill takes less than 2 seconds.   Psychiatric: She has a normal mood and affect. Her behavior is normal.   Vitals reviewed.      XR L toe:   Impression       No acute osseous abnormality.         XR L elbow:   Impression       No acute osseous abnormality.             Assessment/Plan:     1. Fall, initial encounter  DX-TOE(S) 2+ LEFT    DX-ELBOW-COMPLETE 3+ LEFT   2. Injury of toe on left foot, initial encounter  DX-TOE(S) 2+ LEFT   3. Injury of left elbow, initial encounter  DX-ELBOW-COMPLETE 3+ LEFT     Per my read, no fracture seen on x-ray.  Agree with radiology report.     Pt directed RICE and ibuprofen/tylenol as needed for pain. Educational handout on RICE injury provided.      Follow-up with primary care provider.  If symptoms worsen or persist patient can return to clinic for reevaluation. " Patient and  verbalized understanding of information.    Please note that this dictation was created using voice recognition software. I have made every reasonable attempt to correct obvious errors, but I expect that there are errors of grammar and possibly content that I did not discover before finalizing the note.

## 2019-06-01 LAB
MEV IGG SER IA-ACNC: >300 AU/ML
MUV IGG SER IA-ACNC: >300 AU/ML
RUBV IGG SERPL IA-ACNC: 3.56 INDEX
VZV IGG SER IA-ACNC: 462 INDEX

## 2019-06-07 ENCOUNTER — TELEPHONE (OUTPATIENT)
Dept: MEDICAL GROUP | Facility: PHYSICIAN GROUP | Age: 69
End: 2019-06-07

## 2019-06-07 NOTE — TELEPHONE ENCOUNTER
ANNUAL WELLNESS VISIT PRE-VISIT PLANNING WITHOUT OUTREACH    1.  Reviewed note from last office visit with PCP: YES    2.  If any orders were placed at last visit, do we have Results/Consult Notes?        •  Labs - Labs ordered, completed on 06/01/2019 and results are in chart.       •  Imaging - Imaging was not ordered at last office visit.       •  Referrals - No referrals were ordered at last office visit.    3.  Immunizations were updated in [facility] using WebIZ?: Yes       •  WebIZ Recommendations: FLU, HEPATITIS A , HEPATITIS B, PREVNAR (PCV13) , TDAP and SHINGRIX (Shingles)        •  Is patient due for Tdap? YES. Patient was not notified of copay/out of pocket cost.       •  Is patient due for Shingrix? YES. Patient was not notified of copay/out of pocket cost.     4.  Patient is due for the following Health Maintenance Topics:   Health Maintenance Due   Topic Date Due   • HEPATITIS C SCREENING  1950   • IMM ZOSTER VACCINES (1 of 2) 07/23/1999   • IMM DTaP/Tdap/Td Vaccine (1 - Tdap) 07/25/1999   • Annual Wellness Visit  07/11/2018   • MAMMOGRAM  04/26/2019     5.  Reviewed/Updated the following with patient:       •   Preferred Pharmacy? YES       •   Preferred Lab? YES       •   Preferred Communication? YES       •   Allergies? YES       •   Medications? YES. Was Abstract Encounter opened and chart updated? YES       •   Social History? YES. Was Abstract Encounter opened and chart updated? YES       •   Family History (document living status of immediate family members and if + hx of  cancer, diabetes, hypertension, hyperlipidemia, heart attack, stroke) YES. Was Abstract Encounter opened and chart updated? YES    6.  Care Team Updated:       •   DME Company (gait device, O2, CPAP, etc.): YES       •   Other Specialists (eye doctor, derm, GYN, cardiology, endo, etc): YES    7. Orders for overdue Health Maintenance topics pended in Pre-Charting? NO    8.  Patient has the following Care Path diagnoses on  Problem List:  NONE    9.  Patient was advised: “This is a free wellness visit. The provider will screen for medical conditions to help you stay healthy. If you have other concerns to address you may be asked to discuss these at a separate visit or there may be an additional fee.”     10.  Patient was informed to arrive 15 min prior to their scheduled appointment and bring in their medication bottles.

## 2019-06-14 ENCOUNTER — OFFICE VISIT (OUTPATIENT)
Dept: NEUROLOGY | Facility: MEDICAL CENTER | Age: 69
End: 2019-06-14
Payer: MEDICARE

## 2019-06-14 VITALS
SYSTOLIC BLOOD PRESSURE: 150 MMHG | HEIGHT: 61 IN | HEART RATE: 76 BPM | BODY MASS INDEX: 25.3 KG/M2 | TEMPERATURE: 98.1 F | DIASTOLIC BLOOD PRESSURE: 84 MMHG | OXYGEN SATURATION: 98 % | WEIGHT: 134 LBS

## 2019-06-14 DIAGNOSIS — G40.009 PARTIAL IDIOPATHIC EPILEPSY WITH SEIZURES OF LOCALIZED ONSET, NOT INTRACTABLE, WITHOUT STATUS EPILEPTICUS (HCC): Primary | ICD-10-CM

## 2019-06-14 PROCEDURE — 99214 OFFICE O/P EST MOD 30 MIN: CPT | Performed by: PSYCHIATRY & NEUROLOGY

## 2019-06-14 RX ORDER — LEVETIRACETAM 500 MG/1
1000 TABLET, FILM COATED, EXTENDED RELEASE ORAL DAILY
Qty: 180 TAB | Refills: 3 | Status: SHIPPED | OUTPATIENT
Start: 2019-06-14

## 2019-06-14 ASSESSMENT — ENCOUNTER SYMPTOMS
CONSTIPATION: 0
MEMORY LOSS: 0
SEIZURES: 1
TREMORS: 0
INSOMNIA: 1
DIZZINESS: 0
DIARRHEA: 0
NAUSEA: 0

## 2019-06-14 ASSESSMENT — PATIENT HEALTH QUESTIONNAIRE - PHQ9: CLINICAL INTERPRETATION OF PHQ2 SCORE: 0

## 2019-06-14 NOTE — PROGRESS NOTES
"Subjective:      Kelly Guerra is a 68 y.o. female who presents with her  Neymar, as always, for follow-up, with a history of focal onset seizures with altered sensorium, but who suffered from a seizure breakthrough on March 14, 2019..     HPI    Since last seen, she had been doing well.  Unfortunately, she had been placed on irbesartan for blood pressure control, and for several days she had mistaken this drug for her Keppra (levetiracetam) and on day 5 she actually suffered from a seizure.  She realized her mistake, has been compliant with the Keppra ER 1000 mg every evening since that time.  Leading up to this mistaken identity with her medicines, she was compliant.  What she did begin to notice was insomnia occurring with the Keppra taken at bedtime, always a problem for her, it had consistently worsened.  She tolerates the Keppra otherwise without issue.    She did undergo repeat EEG study on April 25, 2019, still showing bitemporal irritability but no sustained elective encephalographic seizure activity, demonstrating a clear improvement on her present medication regimen.    Medical, surgical and family histories are reviewed in the electronic health record, there are no new drug allergies.  She is on Avapro 300 mg daily, Keppra ER 1000 g nightly, and Zantac.    Review of Systems   Constitutional: Negative for malaise/fatigue.   Gastrointestinal: Negative for constipation, diarrhea and nausea.   Neurological: Positive for seizures. Negative for dizziness and tremors.   Psychiatric/Behavioral: Negative for memory loss. The patient has insomnia.    All other systems reviewed and are negative.       Objective:     /84   Pulse 76   Temp 36.7 °C (98.1 °F) (Temporal)   Ht 1.549 m (5' 1\")   Wt 60.8 kg (134 lb)   SpO2 98%   BMI 25.32 kg/m²      Physical Exam    She appears in no acute distress.  Her vital signs are stable.  There is no malar rash.  The neck is supple, range of motion is full.  Cardiac " evaluation reveals a regular rhythm.    In quick and cursory fashion, with observation, mental status, cranial nerve, musculoskeletal and coordination evaluations show no evidence of deficits nor toxicities.     Assessment/Plan:     1. Partial idiopathic epilepsy with seizures of localized onset, not intractable, without status epilepticus (HCC)  Despite the seizure breakthrough, for which a clear cause can be identified, she has done very well, I am pleased with the improvement on her EEG, though the study still shows bitemporal, independent irritative features.  Still, she had to be off her Keppra for 4 days before she got into trouble.  Thus, I think it is safe to keep her on her present dose.  I did recommend switching her to a morning routine to help with the insomnia, sleep deprivation always a potential trigger for seizure recurrence.  She will take an earlier evening dose today at 3 PM, then starting tomorrow taking the full dose in the mornings.  Side effects were reviewed.    A drug level will be checked as a baseline now that we know what type of control she can achieve with the drug.  She will do a trough level, the rationale for this was reviewed.  She asked about driving safety, she was told that with her last seizure occurring now over 3 months ago, in the state of [place], she is safe to drive.  She was also concerned about the stress associated with a move, she and her  are considering this, having just put their house on the market.  She was told that she should be safe, the whole purpose of taking medication is to allow her to get through life's vagaries without concern.  She certainly understands the need for compliance.  We can follow-up in 6 months otherwise, phone contact in the interim if needed.    - KEPPRA; Future  - levetiracetam (KEPPRA) 500 MG TABLET SR 24 HR; Take 2 Tabs by mouth every day.  Dispense: 180 Tab; Refill: 3    Time: 25 minutes spent face-to-face for exam, review,  discussion, and education, of this over 50% of the time spent counseling and coordinating care.

## 2019-06-20 ENCOUNTER — OFFICE VISIT (OUTPATIENT)
Dept: MEDICAL GROUP | Facility: PHYSICIAN GROUP | Age: 69
End: 2019-06-20
Payer: MEDICARE

## 2019-06-20 VITALS
HEART RATE: 79 BPM | HEIGHT: 61 IN | SYSTOLIC BLOOD PRESSURE: 170 MMHG | RESPIRATION RATE: 16 BRPM | DIASTOLIC BLOOD PRESSURE: 80 MMHG | TEMPERATURE: 99 F | OXYGEN SATURATION: 96 % | BODY MASS INDEX: 24.96 KG/M2 | WEIGHT: 132.2 LBS

## 2019-06-20 DIAGNOSIS — R42 DIZZY SPELLS: ICD-10-CM

## 2019-06-20 DIAGNOSIS — Z23 NEED FOR VACCINATION: ICD-10-CM

## 2019-06-20 DIAGNOSIS — E04.1 THYROID NODULE: ICD-10-CM

## 2019-06-20 DIAGNOSIS — L98.9 SKIN LESION: ICD-10-CM

## 2019-06-20 DIAGNOSIS — R19.4 BOWEL HABIT CHANGES: ICD-10-CM

## 2019-06-20 DIAGNOSIS — I10 ESSENTIAL HYPERTENSION: ICD-10-CM

## 2019-06-20 DIAGNOSIS — Z12.39 BREAST CANCER SCREENING: ICD-10-CM

## 2019-06-20 DIAGNOSIS — G40.009 PARTIAL IDIOPATHIC EPILEPSY WITH SEIZURES OF LOCALIZED ONSET, NOT INTRACTABLE, WITHOUT STATUS EPILEPTICUS (HCC): ICD-10-CM

## 2019-06-20 PROCEDURE — 17110 DESTRUCTION B9 LES UP TO 14: CPT | Performed by: INTERNAL MEDICINE

## 2019-06-20 PROCEDURE — 90715 TDAP VACCINE 7 YRS/> IM: CPT | Performed by: INTERNAL MEDICINE

## 2019-06-20 PROCEDURE — 90471 IMMUNIZATION ADMIN: CPT | Performed by: INTERNAL MEDICINE

## 2019-06-20 PROCEDURE — 99214 OFFICE O/P EST MOD 30 MIN: CPT | Mod: 25 | Performed by: INTERNAL MEDICINE

## 2019-06-20 PROCEDURE — G0439 PPPS, SUBSEQ VISIT: HCPCS | Performed by: INTERNAL MEDICINE

## 2019-06-20 ASSESSMENT — ENCOUNTER SYMPTOMS: GENERAL WELL-BEING: FAIR

## 2019-06-20 ASSESSMENT — ACTIVITIES OF DAILY LIVING (ADL): BATHING_REQUIRES_ASSISTANCE: 0

## 2019-06-20 ASSESSMENT — PATIENT HEALTH QUESTIONNAIRE - PHQ9: CLINICAL INTERPRETATION OF PHQ2 SCORE: 0

## 2019-06-20 NOTE — ASSESSMENT & PLAN NOTE
Will have alternating diarrhea and constipation associated with right lower quadrant tenderness. Has a family history of gallbladder issues. Will note predictable right upper quadrant pain after a greasy meal.

## 2019-06-20 NOTE — ASSESSMENT & PLAN NOTE
Has an itchy mole of her back which is concerning her.  is worried since it's changing color and getting bigger. They have been applying anti-itch cream and baby oil without any relief.

## 2019-06-20 NOTE — ASSESSMENT & PLAN NOTE
"Continues to have nausea spells. Although they are fewer in frequency they are more severe. Would like to see if there is any fluid in the ears that could be contributing to her dizziness. Symptoms are more associated with fatigue this time around. These spells first started about 5 years ago. She has discussed this with Dr. Lacy prior and he doesn't believe that the spells are related to the seizures. Her last seizure was the last Tuesday in March but thinks it was due to an error with medication administration. However, she has less \"brain fog\" on her new generic on the keppra.   "

## 2019-06-20 NOTE — ASSESSMENT & PLAN NOTE
Has a history of a thyroid nodule which was supposed to be followed with annual ultrasounds but she hasn't had any ultrasounds lately.

## 2019-06-20 NOTE — PROGRESS NOTES
Chief Complaint   Patient presents with   • Annual Wellness Visit         HPI:  Kelly is a 68 y.o. here for Medicare Annual Wellness Visit        Patient Active Problem List    Diagnosis Date Noted   • Dizzy spells 03/11/2019   • Immunity status testing 03/11/2019   • Partial idiopathic epilepsy with seizures of localized onset, not intractable, without status epilepticus (HCC) 12/14/2018   • Animal dander allergy 12/11/2018   • Seborrheic keratoses 05/30/2018   • Dyslipidemia 11/18/2016   • Essential hypertension 10/12/2015   • Vitamin D insufficiency 06/29/2015   • Sigmoid diverticulosis 04/04/2012   • Prediabetes 04/04/2012   • Osteopenia 06/07/2011       Current Outpatient Prescriptions   Medication Sig Dispense Refill   • levetiracetam (KEPPRA) 500 MG TABLET SR 24 HR Take 2 Tabs by mouth every day. 180 Tab 3   • RaNITidine HCl (ZANTAC 75 PO) Take 1 Tab by mouth as needed.     • irbesartan (AVAPRO) 300 MG Tab Take 1 Tab by mouth every day. 90 Tab 3   • raNITidine (ZANTAC) 150 MG Tab Take 75 mg by mouth 1 time daily as needed.       No current facility-administered medications for this visit.         Patient is taking medications as noted in medication list.  Current supplements as per medication list.     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 No, due for TDAP and SHINGRIX (Shingles). Patient is interested in receiving TDAP today.    She  reports that she has never smoked. She has never used smokeless tobacco. She reports that she does not drink alcohol or use drugs.  Counseling given: Not Answered        DPA/Advanced directive: Patient has Living Will, but it is not on file. Instructed to bring in a copy to scan into their chart.    ROS:    Gait: Uses no assistive device   Ostomy: No   Other tubes: No   Amputations: No   Chronic oxygen use No   Last eye exam 12/2018   Wears hearing aids:  No   : Denies any urinary leakage during the last 6 months    Annual Health Assessment Questions:    1.  Are you currently engaging in any exercise or physical activity? Yes    2.  How would you describe your mood or emotional well-being today? good    3.  Have you had any falls in the last year? No    4.  Have you noticed any problems with your balance or had difficulty walking? No    5.  In the last six months have you experienced any leakage of urine? No    6. DPA/Advanced Directive: Patient has Living Will, but it is not on file. Instructed to bring in a copy to scan into their chart.    Screening:        Depression Screening    Little interest or pleasure in doing things?  0 - not at all  Feeling down, depressed, or hopeless? 0 - not at all  Patient Health Questionnaire Score: 0    If depressive symptoms identified deferred to follow up visit unless specifically addressed in assessment and plan.    Interpretation of PHQ-9 Total Score   Score Severity   1-4 No Depression   5-9 Mild Depression   10-14 Moderate Depression   15-19 Moderately Severe Depression   20-27 Severe Depression    Screening for Cognitive Impairment    Three Minute Recall (village, kitchen, baby)  3/3 Village Kitchen Baby  Angel clock face with all 12 numbers and set the hands to show 10 past 10.  Yes 10:10  5/5  If cognitive concerns identified, deferred for follow up unless specifically addressed in assessment and plan.    Fall Risk Assessment    Has the patient had two or more falls in the last year or any fall with injury in the last year?  No  If fall risk identified, deferred for follow up unless specifically addressed in assessment and plan.    Safety Assessment    Throw rugs on floor.  Yes  Handrails on all stairs.  Yes  Good lighting in all hallways.  Yes  Difficulty hearing.  No  Patient counseled about all safety risks that were identified.    Functional Assessment ADLs    Are there any barriers preventing you from cooking for  yourself or meeting nutritional needs?  No.    Are there any barriers preventing you from driving safely or obtaining transportation?  No.    Are there any barriers preventing you from using a telephone or calling for help?  No.    Are there any barriers preventing you from shopping?  No.    Are there any barriers preventing you from taking care of your own finances?  No.    Are there any barriers preventing you from managing your medications?  No.    Are there any barriers preventing you from showering, bathing or dressing yourself?  No.    Are you currently engaging in any exercise or physical activity?  Yes.  Yard Work, House Work, Walking  What is your perception of your health?  Fair.    Health Maintenance Summary                HEPATITIS C SCREENING Overdue 1950     IMM ZOSTER VACCINES Overdue 7/23/1999     IMM DTaP/Tdap/Td Vaccine Overdue 7/25/1999      Done 7/24/1999 Imm Admin: TD Vaccine    Annual Wellness Visit Overdue 7/11/2018      Done 7/10/2017 Visit Dx: Medicare annual wellness visit, initial    MAMMOGRAM Overdue 4/26/2019      Done 4/26/2018 MA-MAMMO SCREENING BILAT W/TOMOSYNTHESIS W/CAD     Patient has more history with this topic...    IMM PNEUMOCOCCAL 65+ (ADULT) LOW/MEDIUM RISK SERIES Postponed 12/11/2019 Originally 7/23/2014. Patient Refused    IMM INFLUENZA Postponed 12/11/2019 Originally 9/1/2019. Patient Refused    PAP SMEAR Next Due 12/5/2019      Done 12/5/2016 PAP IG (IMAGE GUIDED)    BONE DENSITY Next Due 7/25/2022      Done 7/25/2017 DS-BONE DENSITY STUDY (DEXA)    COLONOSCOPY Next Due 12/20/2022      Done 12/20/2017 REFERRAL TO GI FOR COLONOSCOPY          Patient Care Team:  Miguel Salomon M.D. as PCP - General (Internal Medicine)  Your Physician (Emergency Medicine)  Mare Thomas (Internal Medicine)  [facility] as Consulting Physician  Jeffrey Love M.D. as Consulting Physician (Cardiology)  Clovis Lacy M.D. (Neurology)    Social History  "  Substance Use Topics   • Smoking status: Never Smoker   • Smokeless tobacco: Never Used   • Alcohol use No     Family History   Problem Relation Age of Onset   • Hypertension Mother    • Heart Disease Mother          from CHF   • Stroke Father    • Cancer Brother         Prostate Cancer   • Other Son         Headaches     She  has a past medical history of Dyslipidemia (2016); Epilepsy (HCC) (2018); HTN (hypertension); and Prediabetes (2012).   Past Surgical History:   Procedure Laterality Date   • COLONOSCOPY  2012    clear - 5 years   • APPENDECTOMY             Exam:   B/P: 170/80       Site: Right Arm       Position: Sitting       Cuff Size: Adult  Pulse: 79  Temp: 99.0        Source: Temporal  Weight: 59.966 kg (132lbs 3.2 oz)  Height: 1.537 m (5' 0.5\")  Resp: 16  SpO2: 96%    Hearing good.    Dentition good  Alert, oriented in no acute distress.  Eye contact is good, speech goal directed, affect calm      Assessment and Plan. The following treatment and monitoring plan is recommended:    Essential hypertension  Taking irbesartan 300 mg daily.     Partial idiopathic epilepsy with seizures of localized onset, not intractable, without status epilepticus (HCC)  Taking keppra 1 g daily.     Dizzy spells  Continues to have nausea spells. Although they are fewer in frequency they are more severe. Would like to see if there is any fluid in the ears that could be contributing to her dizziness. Symptoms are more associated with fatigue this time around. These spells first started about 5 years ago. She has discussed this with Dr. Lacy prior and he doesn't believe that the spells are related to the seizures. Her last seizure was the last Tuesday in March but thinks it was due to an error with medication administration. However, she has less \"brain fog\" on her new generic on the keppra.     Bowel habit changes  Will have alternating diarrhea and constipation associated with right lower quadrant " tenderness. Has a family history of gallbladder issues. Will note predictable right upper quadrant pain after a greasy meal.     Skin lesion  Has an itchy mole of her back which is concerning her.  is worried since it's changing color and getting bigger. They have been applying anti-itch cream and baby oil without any relief.     Thyroid nodule  Has a history of a thyroid nodule which was supposed to be followed with annual ultrasounds but she hasn't had any ultrasounds lately.       Services suggested: No services needed at this time  Health Care Screening recommendations as per orders if indicated.  Referrals offered: PT/OT/Nutrition counseling/Behavioral Health/Smoking cessation as per orders if indicated.    Discussion today about general wellness and lifestyle habits:    · Prevent falls and reduce trip hazards; Cautioned about securing or removing rugs.  · Have a working fire alarm and carbon monoxide detector;   · Engage in regular physical activity and social activities       Follow-up: No Follow-up on file.

## 2019-06-21 NOTE — PROGRESS NOTES
"PRIMARY CARE CLINIC FOLLOW UP VISIT  Chief Complaint   Patient presents with   • Annual Wellness Visit       History of Present Illness     Dizzy spells  Continues to have nausea spells. Although they are fewer in frequency they are more severe. Would like to see if there is any fluid in the ears that could be contributing to her dizziness. Symptoms are more associated with fatigue this time around. These spells first started about 5 years ago. She has discussed this with Dr. Lacy prior and he doesn't believe that the spells are related to the seizures. Her last seizure was the last Tuesday in March but thinks it was due to an error with medication administration. However, she has less \"brain fog\" on her new generic on the keppra.     Bowel habit changes  Will have alternating diarrhea and constipation associated with right lower quadrant tenderness. Has a family history of gallbladder issues. Will note predictable right upper quadrant pain after a greasy meal.     Skin lesion  Has an itchy mole of her back which is concerning her.  is worried since it's changing color and getting bigger. They have been applying anti-itch cream and baby oil without any relief.     Thyroid nodule  Has a history of a thyroid nodule which was supposed to be followed with annual ultrasounds but she hasn't had any ultrasounds lately.     Current Outpatient Prescriptions   Medication Sig Dispense Refill   • levetiracetam (KEPPRA) 500 MG TABLET SR 24 HR Take 2 Tabs by mouth every day. 180 Tab 3   • RaNITidine HCl (ZANTAC 75 PO) Take 1 Tab by mouth as needed.     • irbesartan (AVAPRO) 300 MG Tab Take 1 Tab by mouth every day. 90 Tab 3   • raNITidine (ZANTAC) 150 MG Tab Take 75 mg by mouth 1 time daily as needed.       No current facility-administered medications for this visit.      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 All other systems reviewed and negative.        Objective   BP (!) 170/80 (BP Location: Right arm, Patient Position: Sitting, BP Cuff Size: Adult)   Pulse 79   Temp 37.2 °C (99 °F) (Temporal)   Resp 16   Ht 1.537 m (5' 0.5\")   Wt 60 kg (132 lb 3.2 oz)   SpO2 96%  Body mass index is 25.39 kg/m².    General: alert and oriented, pleasant, cooperative  HEENT: Normocephalic, atraumatic. Bilateral tympanic membranes with normal light reflex bilaterally   Gastrointestinal: no tenderness to palpation. No hepatosplenomegaly. Bowel sounds normoactive  Lymphatics: no cervical or supraclavicular lymphadenopathy   Skin: seborrheic keratosis of back   Psychiatric: appropriate mood and affect. Good insight and appropriate judgment     Assessment and Plan   The following treatment plan was discussed     1. Dizzy spells  Unclear etiology, she will start to keep a diary of events to see if any pattern reveals itself.   - Initial Annual Wellness Visit - Includes PPPS ()    2. Bowel habit changes  Her RUQ pain after greasy meals is concerning for a gallbladder issue, check ultrasound.   - US-RUQ; Future  - Initial Annual Wellness Visit - Includes PPPS " ()    3. Need for vaccination  - Tdap Vaccine =>8YO IM  - Initial Annual Wellness Visit - Includes PPPS ()    4. Skin lesion  Consistent with SK, applied 4 rounds of liquid cryotherapy for 3-5 seconds each with resolution of blanching between each round.   - Initial Annual Wellness Visit - Includes PPPS ()    8. Thyroid nodule  - US-SOFT TISSUES OF HEAD - NECK; Future  - Initial Annual Wellness Visit - Includes PPPS ()      Healthcare maintenance     Health Maintenance Due   Topic Date Due   • HEPATITIS C SCREENING  1950   • IMM DTaP/Tdap/Td Vaccine (1 - Tdap) 07/25/1999   • MAMMOGRAM  04/26/2019       No Follow-up on file.    Miguel Salomon MD  Internal Medicine  [facility]

## 2019-07-15 ENCOUNTER — HOSPITAL ENCOUNTER (OUTPATIENT)
Dept: RADIOLOGY | Facility: MEDICAL CENTER | Age: 69
End: 2019-07-15
Attending: INTERNAL MEDICINE
Payer: MEDICARE

## 2019-07-15 DIAGNOSIS — E04.1 THYROID NODULE: ICD-10-CM

## 2019-07-15 DIAGNOSIS — R19.4 BOWEL HABIT CHANGES: ICD-10-CM

## 2019-07-15 PROCEDURE — 76705 ECHO EXAM OF ABDOMEN: CPT

## 2019-07-15 PROCEDURE — 76536 US EXAM OF HEAD AND NECK: CPT

## 2019-07-18 ENCOUNTER — HOSPITAL ENCOUNTER (OUTPATIENT)
Dept: RADIOLOGY | Facility: MEDICAL CENTER | Age: 69
End: 2019-07-18
Attending: INTERNAL MEDICINE
Payer: MEDICARE

## 2019-07-18 DIAGNOSIS — Z12.39 BREAST CANCER SCREENING: ICD-10-CM

## 2019-07-18 PROCEDURE — 77063 BREAST TOMOSYNTHESIS BI: CPT

## 2019-08-12 ENCOUNTER — OFFICE VISIT (OUTPATIENT)
Dept: URGENT CARE | Facility: PHYSICIAN GROUP | Age: 69
End: 2019-08-12
Payer: MEDICARE

## 2019-08-12 VITALS
HEIGHT: 61 IN | OXYGEN SATURATION: 99 % | TEMPERATURE: 97 F | SYSTOLIC BLOOD PRESSURE: 122 MMHG | BODY MASS INDEX: 24.92 KG/M2 | DIASTOLIC BLOOD PRESSURE: 80 MMHG | HEART RATE: 72 BPM | WEIGHT: 132 LBS | RESPIRATION RATE: 20 BRPM

## 2019-08-12 DIAGNOSIS — N30.01 ACUTE CYSTITIS WITH HEMATURIA: ICD-10-CM

## 2019-08-12 LAB
APPEARANCE UR: CLEAR
BILIRUB UR STRIP-MCNC: NORMAL MG/DL
COLOR UR AUTO: NORMAL
GLUCOSE UR STRIP.AUTO-MCNC: NORMAL MG/DL
KETONES UR STRIP.AUTO-MCNC: NORMAL MG/DL
LEUKOCYTE ESTERASE UR QL STRIP.AUTO: NORMAL
NITRITE UR QL STRIP.AUTO: NORMAL
PH UR STRIP.AUTO: 5.5 [PH] (ref 5–8)
PROT UR QL STRIP: 100 MG/DL
RBC UR QL AUTO: NORMAL
SP GR UR STRIP.AUTO: 1.01
UROBILINOGEN UR STRIP-MCNC: NORMAL MG/DL

## 2019-08-12 PROCEDURE — 99214 OFFICE O/P EST MOD 30 MIN: CPT | Performed by: FAMILY MEDICINE

## 2019-08-12 PROCEDURE — 81002 URINALYSIS NONAUTO W/O SCOPE: CPT | Performed by: FAMILY MEDICINE

## 2019-08-12 RX ORDER — CEFDINIR 300 MG/1
300 CAPSULE ORAL EVERY 12 HOURS
Qty: 10 CAP | Refills: 0 | Status: SHIPPED | OUTPATIENT
Start: 2019-08-12 | End: 2019-08-17

## 2019-08-12 RX ORDER — PHENAZOPYRIDINE HYDROCHLORIDE 200 MG/1
200 TABLET, FILM COATED ORAL 3 TIMES DAILY
Qty: 6 TAB | Refills: 0 | Status: SHIPPED | OUTPATIENT
Start: 2019-08-12 | End: 2019-08-14

## 2019-08-12 ASSESSMENT — ENCOUNTER SYMPTOMS
FLANK PAIN: 0
VOMITING: 0
NAUSEA: 0
CHILLS: 0

## 2019-08-12 NOTE — PROGRESS NOTES
"  Subjective:   Kelly Brower a 69 y.o. female who presents for Painful Urination (with blood in urine )    Dysuria    This is a new problem. The current episode started in the past 7 days. The problem occurs every urination. The problem has been rapidly worsening. The quality of the pain is described as burning. The pain is severe. Associated symptoms include frequency. Pertinent negatives include no chills, flank pain, hesitancy, nausea, urgency or vomiting.     Review of Systems   Constitutional: Negative for chills.   Gastrointestinal: Negative for nausea and vomiting.   Genitourinary: Positive for dysuria and frequency. Negative for flank pain, hesitancy and urgency.     Allergies   Allergen Reactions   • Tape      Paper tape okay      Objective:   /80 (BP Location: Right arm, Patient Position: Sitting, BP Cuff Size: Adult)   Pulse 72   Temp 36.1 °C (97 °F) (Temporal)   Resp 20   Ht 1.537 m (5' 0.5\")   Wt 59.9 kg (132 lb)   SpO2 99%   BMI 25.36 kg/m²   Physical Exam   Constitutional: She is oriented to person, place, and time. She appears well-developed and well-nourished. No distress.   HENT:   Head: Normocephalic and atraumatic.   Eyes: Pupils are equal, round, and reactive to light. Conjunctivae and EOM are normal.   Cardiovascular: Normal rate and regular rhythm.   No murmur heard.  Pulmonary/Chest: Effort normal and breath sounds normal. No respiratory distress.   Abdominal: Soft. Bowel sounds are normal. She exhibits no distension. There is tenderness in the suprapubic area. There is no CVA tenderness.   Neurological: She is alert and oriented to person, place, and time. She has normal reflexes. No sensory deficit.   Skin: Skin is warm and dry.   Psychiatric: She has a normal mood and affect.     Assessment/Plan:   Assessment    1. Acute cystitis with hematuria  - POCT Urinalysis [[id number]]  - phenazopyridine (PYRIDIUM) 200 MG Tab; Take 1 Tab by mouth 3 times a day for 2 days.  Dispense: 6 " Tab; Refill: 0  - cefdinir (OMNICEF) 300 MG Cap; Take 1 Cap by mouth every 12 hours for 5 days.  Dispense: 10 Cap; Refill: 0    Differential diagnosis, natural history, supportive care, and indications for immediate follow-up discussed.  Return if symptoms worsen or fail to improve.

## 2019-09-05 ENCOUNTER — TELEPHONE (OUTPATIENT)
Dept: MEDICAL GROUP | Facility: PHYSICIAN GROUP | Age: 69
End: 2019-09-05

## 2019-09-05 RX ORDER — LOSARTAN POTASSIUM 100 MG/1
100 TABLET ORAL DAILY
Qty: 90 TAB | Refills: 3 | Status: SHIPPED | OUTPATIENT
Start: 2019-09-05

## 2019-09-05 NOTE — TELEPHONE ENCOUNTER
[id number] - [place] - [address]  [address]  [place] [address]  Phone: [phone] Fax: [phone]    Pharmacy received rx for irbesartan and states it is on backorder.  They are asking for an aternative

## 2019-11-05 DIAGNOSIS — G40.009 PARTIAL IDIOPATHIC EPILEPSY WITH SEIZURES OF LOCALIZED ONSET, NOT INTRACTABLE, WITHOUT STATUS EPILEPTICUS (HCC): ICD-10-CM

## 2019-11-06 RX ORDER — LEVETIRACETAM 500 MG/1
TABLET, FILM COATED, EXTENDED RELEASE ORAL
Qty: 180 TAB | Refills: 3 | Status: SHIPPED | OUTPATIENT
Start: 2019-11-06 | End: 2020-02-06

## 2019-11-06 NOTE — TELEPHONE ENCOUNTER
Was the patient seen in the last year in this department? Yes last seen 6/14/2019    Does patient have an active prescription for medications requested? Yes     Received Request Via: Patient

## 2020-02-06 DIAGNOSIS — G40.009 PARTIAL IDIOPATHIC EPILEPSY WITH SEIZURES OF LOCALIZED ONSET, NOT INTRACTABLE, WITHOUT STATUS EPILEPTICUS (HCC): ICD-10-CM

## 2020-02-06 RX ORDER — LEVETIRACETAM 500 MG/1
TABLET, FILM COATED, EXTENDED RELEASE ORAL
Qty: 180 TAB | Refills: 2 | Status: SHIPPED | OUTPATIENT
Start: 2020-02-06

## 2020-02-06 NOTE — TELEPHONE ENCOUNTER
Received request via: Pharmacy    Was the patient seen in the last year in this department? Yes    Does the patient have an active prescription (recently filled or refills available) for medication(s) requested? Yes.